# Patient Record
Sex: MALE | Race: WHITE | NOT HISPANIC OR LATINO | Employment: PART TIME | URBAN - METROPOLITAN AREA
[De-identification: names, ages, dates, MRNs, and addresses within clinical notes are randomized per-mention and may not be internally consistent; named-entity substitution may affect disease eponyms.]

---

## 2020-01-01 ENCOUNTER — HOME CARE/HOSPICE - HEALTHEAST (OUTPATIENT)
Dept: HOME HEALTH SERVICES | Facility: HOME HEALTH | Age: 45
End: 2020-01-01

## 2020-01-01 ENCOUNTER — COMMUNICATION - HEALTHEAST (OUTPATIENT)
Dept: HOME HEALTH SERVICES | Facility: HOME HEALTH | Age: 45
End: 2020-01-01

## 2020-01-01 ENCOUNTER — AMBULATORY - HEALTHEAST (OUTPATIENT)
Dept: LAB | Facility: CLINIC | Age: 45
End: 2020-01-01

## 2020-01-01 ENCOUNTER — OFFICE VISIT - HEALTHEAST (OUTPATIENT)
Dept: FAMILY MEDICINE | Facility: CLINIC | Age: 45
End: 2020-01-01

## 2020-01-01 ENCOUNTER — AMBULATORY - HEALTHEAST (OUTPATIENT)
Dept: FAMILY MEDICINE | Facility: CLINIC | Age: 45
End: 2020-01-01

## 2020-01-01 ENCOUNTER — COMMUNICATION - HEALTHEAST (OUTPATIENT)
Dept: FAMILY MEDICINE | Facility: CLINIC | Age: 45
End: 2020-01-01

## 2020-01-01 DIAGNOSIS — Z13.220 LIPID SCREENING: ICD-10-CM

## 2020-01-01 DIAGNOSIS — D72.829 LEUKOCYTOSIS, UNSPECIFIED TYPE: ICD-10-CM

## 2020-01-01 DIAGNOSIS — Z93.3 COLOSTOMY PRESENT (H): ICD-10-CM

## 2020-01-01 DIAGNOSIS — R73.9 ELEVATED BLOOD SUGAR: ICD-10-CM

## 2020-01-01 DIAGNOSIS — R00.0 TACHYCARDIA: ICD-10-CM

## 2020-01-01 DIAGNOSIS — T07.XXXA MULTIPLE TRAUMATIC INJURIES: ICD-10-CM

## 2020-01-01 DIAGNOSIS — R09.81 CONGESTION OF PARANASAL SINUS: ICD-10-CM

## 2020-01-01 DIAGNOSIS — B08.1 MOLLUSCUM CONTAGIOSUM: ICD-10-CM

## 2020-01-01 DIAGNOSIS — E66.01 MORBID OBESITY (H): ICD-10-CM

## 2020-01-01 DIAGNOSIS — E11.9 TYPE 2 DIABETES MELLITUS WITHOUT COMPLICATION, WITHOUT LONG-TERM CURRENT USE OF INSULIN (H): ICD-10-CM

## 2020-01-01 DIAGNOSIS — I10 BENIGN ESSENTIAL HYPERTENSION: ICD-10-CM

## 2020-01-01 LAB
ALBUMIN SERPL-MCNC: 3.9 G/DL (ref 3.5–5)
ALP SERPL-CCNC: 81 U/L (ref 45–120)
ALT SERPL W P-5'-P-CCNC: 74 U/L (ref 0–45)
ANION GAP SERPL CALCULATED.3IONS-SCNC: 9 MMOL/L (ref 5–18)
AST SERPL W P-5'-P-CCNC: 46 U/L (ref 0–40)
BASOPHILS # BLD AUTO: 0.1 THOU/UL (ref 0–0.2)
BASOPHILS NFR BLD AUTO: 1 % (ref 0–2)
BILIRUB SERPL-MCNC: 0.4 MG/DL (ref 0–1)
BUN SERPL-MCNC: 19 MG/DL (ref 8–22)
CALCIUM SERPL-MCNC: 9.6 MG/DL (ref 8.5–10.5)
CHLORIDE BLD-SCNC: 103 MMOL/L (ref 98–107)
CHOLEST SERPL-MCNC: 223 MG/DL
CO2 SERPL-SCNC: 25 MMOL/L (ref 22–31)
CREAT SERPL-MCNC: 0.79 MG/DL (ref 0.7–1.3)
EOSINOPHIL # BLD AUTO: 0.2 THOU/UL (ref 0–0.4)
EOSINOPHIL NFR BLD AUTO: 2 % (ref 0–6)
ERYTHROCYTE [DISTWIDTH] IN BLOOD BY AUTOMATED COUNT: 18.5 % (ref 11–14.5)
ERYTHROCYTE [DISTWIDTH] IN BLOOD BY AUTOMATED COUNT: 20.2 % (ref 11–14.5)
FASTING STATUS PATIENT QL REPORTED: YES
GFR SERPL CREATININE-BSD FRML MDRD: >60 ML/MIN/1.73M2
GLUCOSE BLD-MCNC: 182 MG/DL (ref 70–125)
HBA1C MFR BLD: 7 %
HCT VFR BLD AUTO: 46 % (ref 40–54)
HCT VFR BLD AUTO: 46.3 % (ref 40–54)
HDLC SERPL-MCNC: 74 MG/DL
HGB BLD-MCNC: 14.2 G/DL (ref 14–18)
HGB BLD-MCNC: 14.5 G/DL (ref 14–18)
IMM GRANULOCYTES # BLD: 0.5 THOU/UL
IMM GRANULOCYTES NFR BLD: 5 %
LDLC SERPL CALC-MCNC: 111 MG/DL
LYMPHOCYTES # BLD AUTO: 1.6 THOU/UL (ref 0.8–4.4)
LYMPHOCYTES NFR BLD AUTO: 15 % (ref 20–40)
MAGNESIUM SERPL-MCNC: 1.9 MG/DL (ref 1.8–2.6)
MCH RBC QN AUTO: 24.9 PG (ref 27–34)
MCH RBC QN AUTO: 25.2 PG (ref 27–34)
MCHC RBC AUTO-ENTMCNC: 30.7 G/DL (ref 32–36)
MCHC RBC AUTO-ENTMCNC: 31.5 G/DL (ref 32–36)
MCV RBC AUTO: 80 FL (ref 80–100)
MCV RBC AUTO: 81 FL (ref 80–100)
MONOCYTES # BLD AUTO: 0.9 THOU/UL (ref 0–0.9)
MONOCYTES NFR BLD AUTO: 8 % (ref 2–10)
NEUTROPHILS # BLD AUTO: 7.8 THOU/UL (ref 2–7.7)
NEUTROPHILS NFR BLD AUTO: 70 % (ref 50–70)
PLATELET # BLD AUTO: 279 THOU/UL (ref 140–440)
PLATELET # BLD AUTO: 296 THOU/UL (ref 140–440)
PMV BLD AUTO: 12.6 FL (ref 8.5–12.5)
PMV BLD AUTO: 8.3 FL (ref 7–10)
POTASSIUM BLD-SCNC: 4.9 MMOL/L (ref 3.5–5)
PROT SERPL-MCNC: 7.4 G/DL (ref 6–8)
RBC # BLD AUTO: 5.7 MILL/UL (ref 4.4–6.2)
RBC # BLD AUTO: 5.76 MILL/UL (ref 4.4–6.2)
SODIUM SERPL-SCNC: 137 MMOL/L (ref 136–145)
TRIGL SERPL-MCNC: 189 MG/DL
TSH SERPL DL<=0.005 MIU/L-ACNC: 1.2 UIU/ML (ref 0.3–5)
WBC: 11.1 THOU/UL (ref 4–11)
WBC: 18.4 THOU/UL (ref 4–11)

## 2020-08-14 ENCOUNTER — TRANSFERRED RECORDS (OUTPATIENT)
Dept: HEALTH INFORMATION MANAGEMENT | Facility: CLINIC | Age: 45
End: 2020-08-14

## 2020-08-17 LAB
ALT SERPL-CCNC: 86 IU/L (ref 12–68)
AST SERPL-CCNC: 47 IU/L (ref 12–37)

## 2020-09-04 ENCOUNTER — RECORDS - HEALTHEAST (OUTPATIENT)
Dept: ADMINISTRATIVE | Facility: OTHER | Age: 45
End: 2020-09-04

## 2020-09-07 LAB
CREAT SERPL-MCNC: 0.66 MG/DL (ref 0.7–1.3)
GFR SERPL CREATININE-BSD FRML MDRD: >60 ML/MIN
GLUCOSE SERPL-MCNC: 123 MG/DL (ref 74–106)

## 2020-09-08 LAB — POTASSIUM SERPL-SCNC: 3.9 MMOL/L (ref 3.5–5.1)

## 2020-09-12 ENCOUNTER — HOSPITAL ENCOUNTER (INPATIENT)
Facility: CLINIC | Age: 45
LOS: 10 days | Discharge: HOME HEALTH | DRG: 560 | End: 2020-09-22
Attending: PHYSICAL MEDICINE & REHABILITATION | Admitting: PHYSICAL MEDICINE & REHABILITATION
Payer: COMMERCIAL

## 2020-09-12 ENCOUNTER — APPOINTMENT (OUTPATIENT)
Dept: GENERAL RADIOLOGY | Facility: CLINIC | Age: 45
DRG: 560 | End: 2020-09-12
Attending: PHYSICAL MEDICINE & REHABILITATION
Payer: COMMERCIAL

## 2020-09-12 DIAGNOSIS — T07.XXXA MULTIPLE TRAUMATIC INJURIES: Primary | ICD-10-CM

## 2020-09-12 LAB
ANION GAP SERPL CALCULATED.3IONS-SCNC: 10 MMOL/L (ref 3–14)
BUN SERPL-MCNC: 7 MG/DL (ref 7–30)
CALCIUM SERPL-MCNC: 10 MG/DL (ref 8.5–10.1)
CHLORIDE SERPL-SCNC: 104 MMOL/L (ref 94–109)
CO2 SERPL-SCNC: 23 MMOL/L (ref 20–32)
CREAT SERPL-MCNC: 0.69 MG/DL (ref 0.66–1.25)
CRP SERPL-MCNC: 81.9 MG/L (ref 0–8)
ERYTHROCYTE [DISTWIDTH] IN BLOOD BY AUTOMATED COUNT: 21.2 % (ref 10–15)
ERYTHROCYTE [SEDIMENTATION RATE] IN BLOOD BY WESTERGREN METHOD: 70 MM/H (ref 0–15)
GFR SERPL CREATININE-BSD FRML MDRD: >90 ML/MIN/{1.73_M2}
GLUCOSE SERPL-MCNC: 104 MG/DL (ref 70–99)
HCT VFR BLD AUTO: 38.1 % (ref 40–53)
HGB BLD-MCNC: 11.1 G/DL (ref 13.3–17.7)
MCH RBC QN AUTO: 23 PG (ref 26.5–33)
MCHC RBC AUTO-ENTMCNC: 29.1 G/DL (ref 31.5–36.5)
MCV RBC AUTO: 79 FL (ref 78–100)
PLATELET # BLD AUTO: 535 10E9/L (ref 150–450)
POTASSIUM SERPL-SCNC: 3.4 MMOL/L (ref 3.4–5.3)
RBC # BLD AUTO: 4.82 10E12/L (ref 4.4–5.9)
SARS-COV-2 RNA SPEC QL NAA+PROBE: NORMAL
SODIUM SERPL-SCNC: 137 MMOL/L (ref 133–144)
SPECIMEN SOURCE: NORMAL
WBC # BLD AUTO: 8.6 10E9/L (ref 4–11)

## 2020-09-12 PROCEDURE — 80048 BASIC METABOLIC PNL TOTAL CA: CPT | Performed by: PHYSICAL MEDICINE & REHABILITATION

## 2020-09-12 PROCEDURE — 25000132 ZZH RX MED GY IP 250 OP 250 PS 637: Performed by: STUDENT IN AN ORGANIZED HEALTH CARE EDUCATION/TRAINING PROGRAM

## 2020-09-12 PROCEDURE — 12800006 ZZH R&B REHAB

## 2020-09-12 PROCEDURE — U0003 INFECTIOUS AGENT DETECTION BY NUCLEIC ACID (DNA OR RNA); SEVERE ACUTE RESPIRATORY SYNDROME CORONAVIRUS 2 (SARS-COV-2) (CORONAVIRUS DISEASE [COVID-19]), AMPLIFIED PROBE TECHNIQUE, MAKING USE OF HIGH THROUGHPUT TECHNOLOGIES AS DESCRIBED BY CMS-2020-01-R: HCPCS | Performed by: PHYSICAL MEDICINE & REHABILITATION

## 2020-09-12 PROCEDURE — 86140 C-REACTIVE PROTEIN: CPT | Performed by: PHYSICAL MEDICINE & REHABILITATION

## 2020-09-12 PROCEDURE — 36415 COLL VENOUS BLD VENIPUNCTURE: CPT | Performed by: PHYSICAL MEDICINE & REHABILITATION

## 2020-09-12 PROCEDURE — 71046 X-RAY EXAM CHEST 2 VIEWS: CPT

## 2020-09-12 PROCEDURE — 85027 COMPLETE CBC AUTOMATED: CPT | Performed by: PHYSICAL MEDICINE & REHABILITATION

## 2020-09-12 PROCEDURE — 85652 RBC SED RATE AUTOMATED: CPT | Performed by: PHYSICAL MEDICINE & REHABILITATION

## 2020-09-12 RX ORDER — POLYETHYLENE GLYCOL 3350 17 G/17G
17 POWDER, FOR SOLUTION ORAL DAILY PRN
Status: DISCONTINUED | OUTPATIENT
Start: 2020-09-12 | End: 2020-09-22 | Stop reason: HOSPADM

## 2020-09-12 RX ORDER — OXYCODONE HYDROCHLORIDE 5 MG/1
5 TABLET ORAL EVERY 4 HOURS PRN
Status: DISCONTINUED | OUTPATIENT
Start: 2020-09-12 | End: 2020-09-22 | Stop reason: HOSPADM

## 2020-09-12 RX ORDER — ACETAMINOPHEN 325 MG/1
975 TABLET ORAL 3 TIMES DAILY
Status: DISCONTINUED | OUTPATIENT
Start: 2020-09-12 | End: 2020-09-22 | Stop reason: HOSPADM

## 2020-09-12 RX ORDER — MINERAL OIL/HYDROPHIL PETROLAT
OINTMENT (GRAM) TOPICAL
Status: DISCONTINUED | OUTPATIENT
Start: 2020-09-12 | End: 2020-09-22 | Stop reason: HOSPADM

## 2020-09-12 RX ORDER — HYDROXYZINE HYDROCHLORIDE 25 MG/1
25 TABLET, FILM COATED ORAL EVERY 6 HOURS PRN
Status: DISCONTINUED | OUTPATIENT
Start: 2020-09-12 | End: 2020-09-22 | Stop reason: HOSPADM

## 2020-09-12 RX ORDER — LANOLIN ALCOHOL/MO/W.PET/CERES
9 CREAM (GRAM) TOPICAL EVERY 24 HOURS
Status: DISCONTINUED | OUTPATIENT
Start: 2020-09-12 | End: 2020-09-22 | Stop reason: HOSPADM

## 2020-09-12 RX ORDER — DOCUSATE SODIUM 100 MG/1
100 CAPSULE, LIQUID FILLED ORAL 2 TIMES DAILY
Status: DISCONTINUED | OUTPATIENT
Start: 2020-09-12 | End: 2020-09-22 | Stop reason: HOSPADM

## 2020-09-12 RX ORDER — LORATADINE 10 MG/1
10 TABLET ORAL DAILY
Status: DISCONTINUED | OUTPATIENT
Start: 2020-09-12 | End: 2020-09-22 | Stop reason: HOSPADM

## 2020-09-12 RX ORDER — NALOXONE HYDROCHLORIDE 0.4 MG/ML
.1-.4 INJECTION, SOLUTION INTRAMUSCULAR; INTRAVENOUS; SUBCUTANEOUS
Status: DISCONTINUED | OUTPATIENT
Start: 2020-09-12 | End: 2020-09-22 | Stop reason: HOSPADM

## 2020-09-12 RX ORDER — MULTIPLE VITAMINS W/ MINERALS TAB 9MG-400MCG
1 TAB ORAL DAILY
Status: DISCONTINUED | OUTPATIENT
Start: 2020-09-12 | End: 2020-09-22 | Stop reason: HOSPADM

## 2020-09-12 RX ORDER — MIRTAZAPINE 15 MG/1
15 TABLET, FILM COATED ORAL AT BEDTIME
Status: DISCONTINUED | OUTPATIENT
Start: 2020-09-12 | End: 2020-09-22 | Stop reason: HOSPADM

## 2020-09-12 RX ORDER — FERROUS SULFATE 325(65) MG
325 TABLET ORAL 2 TIMES DAILY WITH MEALS
Status: DISCONTINUED | OUTPATIENT
Start: 2020-09-12 | End: 2020-09-22 | Stop reason: HOSPADM

## 2020-09-12 RX ORDER — HYDROXYZINE HYDROCHLORIDE 25 MG/1
50 TABLET, FILM COATED ORAL EVERY 6 HOURS PRN
Status: DISCONTINUED | OUTPATIENT
Start: 2020-09-12 | End: 2020-09-22 | Stop reason: HOSPADM

## 2020-09-12 RX ORDER — BENZOCAINE/MENTHOL 6 MG-10 MG
LOZENGE MUCOUS MEMBRANE 3 TIMES DAILY PRN
Status: DISCONTINUED | OUTPATIENT
Start: 2020-09-12 | End: 2020-09-22 | Stop reason: HOSPADM

## 2020-09-12 RX ADMIN — Medication 1 G: at 20:55

## 2020-09-12 RX ADMIN — DOCUSATE SODIUM 100 MG: 100 CAPSULE, LIQUID FILLED ORAL at 20:53

## 2020-09-12 RX ADMIN — MIRTAZAPINE 15 MG: 15 TABLET, FILM COATED ORAL at 21:01

## 2020-09-12 RX ADMIN — FERROUS SULFATE TAB 325 MG (65 MG ELEMENTAL FE) 325 MG: 325 (65 FE) TAB at 18:08

## 2020-09-12 RX ADMIN — MELATONIN TAB 3 MG 9 MG: 3 TAB at 20:53

## 2020-09-12 RX ADMIN — ACETAMINOPHEN 975 MG: 325 TABLET, FILM COATED ORAL at 20:53

## 2020-09-12 RX ADMIN — Medication 1 G: at 18:09

## 2020-09-12 RX ADMIN — APIXABAN 5 MG: 2.5 TABLET, FILM COATED ORAL at 20:53
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
## 2020-09-12 NOTE — PROGRESS NOTES
Patient is alert/oriented x4, arrived around 12:30pm.  Patient was able to stand on standing scale and pivot into bed with Ao1, fatigues quickly.  Patient was tachy at start of shift, per report has been going up into 130's with activity, also had low grade temp 99.2.  Patient denies pain at this time, has had mild cough that is productive, some crackles in bases, given incentive spirometer. MD aware of this.  Patient still had low grade temp at 1440, and HR was still tachy 130 at rest, MD updated on this- held scheduled Tylenol to continue to monitor.  Patient ordered late lunch and denies difficulty with swallowing. Next shift aware to f/u on concerns.  Special ISO started for now, patient aware.

## 2020-09-13 LAB
ANION GAP SERPL CALCULATED.3IONS-SCNC: 10 MMOL/L (ref 3–14)
BASOPHILS # BLD AUTO: 0.1 10E9/L (ref 0–0.2)
BASOPHILS NFR BLD AUTO: 0.8 %
BUN SERPL-MCNC: 6 MG/DL (ref 7–30)
CALCIUM SERPL-MCNC: 9.9 MG/DL (ref 8.5–10.1)
CHLORIDE SERPL-SCNC: 105 MMOL/L (ref 94–109)
CO2 SERPL-SCNC: 25 MMOL/L (ref 20–32)
CREAT SERPL-MCNC: 0.68 MG/DL (ref 0.66–1.25)
DIFFERENTIAL METHOD BLD: ABNORMAL
EOSINOPHIL # BLD AUTO: 0.4 10E9/L (ref 0–0.7)
EOSINOPHIL NFR BLD AUTO: 4.9 %
ERYTHROCYTE [DISTWIDTH] IN BLOOD BY AUTOMATED COUNT: 21 % (ref 10–15)
GFR SERPL CREATININE-BSD FRML MDRD: >90 ML/MIN/{1.73_M2}
GLUCOSE SERPL-MCNC: 96 MG/DL (ref 70–99)
HCT VFR BLD AUTO: 36.8 % (ref 40–53)
HGB BLD-MCNC: 11.1 G/DL (ref 13.3–17.7)
IMM GRANULOCYTES # BLD: 0.1 10E9/L (ref 0–0.4)
IMM GRANULOCYTES NFR BLD: 0.8 %
LABORATORY COMMENT REPORT: NORMAL
LYMPHOCYTES # BLD AUTO: 1.3 10E9/L (ref 0.8–5.3)
LYMPHOCYTES NFR BLD AUTO: 16.7 %
MCH RBC QN AUTO: 23.4 PG (ref 26.5–33)
MCHC RBC AUTO-ENTMCNC: 30.2 G/DL (ref 31.5–36.5)
MCV RBC AUTO: 78 FL (ref 78–100)
MONOCYTES # BLD AUTO: 0.8 10E9/L (ref 0–1.3)
MONOCYTES NFR BLD AUTO: 10 %
NEUTROPHILS # BLD AUTO: 5.2 10E9/L (ref 1.6–8.3)
NEUTROPHILS NFR BLD AUTO: 66.8 %
NRBC # BLD AUTO: 0 10*3/UL
NRBC BLD AUTO-RTO: 0 /100
PLATELET # BLD AUTO: 575 10E9/L (ref 150–450)
POTASSIUM SERPL-SCNC: 3.6 MMOL/L (ref 3.4–5.3)
RBC # BLD AUTO: 4.74 10E12/L (ref 4.4–5.9)
SARS-COV-2 RNA SPEC QL NAA+PROBE: NEGATIVE
SODIUM SERPL-SCNC: 140 MMOL/L (ref 133–144)
SPECIMEN SOURCE: NORMAL
WBC # BLD AUTO: 7.8 10E9/L (ref 4–11)

## 2020-09-13 PROCEDURE — 25000132 ZZH RX MED GY IP 250 OP 250 PS 637: Performed by: STUDENT IN AN ORGANIZED HEALTH CARE EDUCATION/TRAINING PROGRAM

## 2020-09-13 PROCEDURE — 12800006 ZZH R&B REHAB

## 2020-09-13 PROCEDURE — 97165 OT EVAL LOW COMPLEX 30 MIN: CPT | Mod: GO | Performed by: OCCUPATIONAL THERAPIST

## 2020-09-13 PROCEDURE — 36415 COLL VENOUS BLD VENIPUNCTURE: CPT | Performed by: PHYSICAL MEDICINE & REHABILITATION

## 2020-09-13 PROCEDURE — 97530 THERAPEUTIC ACTIVITIES: CPT | Mod: GP | Performed by: STUDENT IN AN ORGANIZED HEALTH CARE EDUCATION/TRAINING PROGRAM

## 2020-09-13 PROCEDURE — 97535 SELF CARE MNGMENT TRAINING: CPT | Mod: GO | Performed by: OCCUPATIONAL THERAPIST

## 2020-09-13 PROCEDURE — 97116 GAIT TRAINING THERAPY: CPT | Mod: GP | Performed by: STUDENT IN AN ORGANIZED HEALTH CARE EDUCATION/TRAINING PROGRAM

## 2020-09-13 PROCEDURE — 85025 COMPLETE CBC W/AUTO DIFF WBC: CPT | Performed by: PHYSICAL MEDICINE & REHABILITATION

## 2020-09-13 PROCEDURE — 97110 THERAPEUTIC EXERCISES: CPT | Mod: GP | Performed by: STUDENT IN AN ORGANIZED HEALTH CARE EDUCATION/TRAINING PROGRAM

## 2020-09-13 PROCEDURE — 80048 BASIC METABOLIC PNL TOTAL CA: CPT | Performed by: PHYSICAL MEDICINE & REHABILITATION

## 2020-09-13 PROCEDURE — 97163 PT EVAL HIGH COMPLEX 45 MIN: CPT | Mod: GP | Performed by: STUDENT IN AN ORGANIZED HEALTH CARE EDUCATION/TRAINING PROGRAM

## 2020-09-13 RX ORDER — ACETAMINOPHEN 325 MG/1
975 TABLET ORAL 3 TIMES DAILY
Status: ON HOLD | COMMUNITY
End: 2020-09-21

## 2020-09-13 RX ORDER — ASPIRIN 81 MG
100 TABLET, DELAYED RELEASE (ENTERIC COATED) ORAL 2 TIMES DAILY
Status: ON HOLD | COMMUNITY
End: 2020-09-21

## 2020-09-13 RX ORDER — FERROUS SULFATE 325(65) MG
325 TABLET ORAL 2 TIMES DAILY
Status: ON HOLD | COMMUNITY
End: 2020-09-21

## 2020-09-13 RX ORDER — MIRTAZAPINE 15 MG/1
15 TABLET, FILM COATED ORAL AT BEDTIME
Status: ON HOLD | COMMUNITY
End: 2020-09-21

## 2020-09-13 RX ORDER — OXYCODONE HYDROCHLORIDE 5 MG/1
5 TABLET ORAL EVERY 4 HOURS PRN
Status: ON HOLD | COMMUNITY
End: 2020-09-21

## 2020-09-13 RX ORDER — LORATADINE 10 MG/1
10 TABLET ORAL DAILY
COMMUNITY

## 2020-09-13 RX ORDER — MULTIPLE VITAMINS W/ MINERALS TAB 9MG-400MCG
1 TAB ORAL DAILY
Status: ON HOLD | COMMUNITY
End: 2020-09-21

## 2020-09-13 RX ADMIN — MULTIPLE VITAMINS W/ MINERALS TAB 1 TABLET: TAB at 08:37

## 2020-09-13 RX ADMIN — FERROUS SULFATE TAB 325 MG (65 MG ELEMENTAL FE) 325 MG: 325 (65 FE) TAB at 18:39

## 2020-09-13 RX ADMIN — DOCUSATE SODIUM 100 MG: 100 CAPSULE, LIQUID FILLED ORAL at 08:37

## 2020-09-13 RX ADMIN — APIXABAN 5 MG: 2.5 TABLET, FILM COATED ORAL at 20:47

## 2020-09-13 RX ADMIN — Medication 1 G: at 14:45

## 2020-09-13 RX ADMIN — FERROUS SULFATE TAB 325 MG (65 MG ELEMENTAL FE) 325 MG: 325 (65 FE) TAB at 08:37

## 2020-09-13 RX ADMIN — MIRTAZAPINE 15 MG: 15 TABLET, FILM COATED ORAL at 21:21

## 2020-09-13 RX ADMIN — OXYCODONE HYDROCHLORIDE 5 MG: 5 TABLET ORAL at 09:36

## 2020-09-13 RX ADMIN — DOCUSATE SODIUM 100 MG: 100 CAPSULE, LIQUID FILLED ORAL at 20:47

## 2020-09-13 RX ADMIN — ACETAMINOPHEN 975 MG: 325 TABLET, FILM COATED ORAL at 20:47

## 2020-09-13 RX ADMIN — MELATONIN TAB 3 MG 9 MG: 3 TAB at 21:20

## 2020-09-13 RX ADMIN — ACETAMINOPHEN 975 MG: 325 TABLET, FILM COATED ORAL at 08:37

## 2020-09-13 RX ADMIN — LORATADINE 10 MG: 10 TABLET ORAL at 08:37

## 2020-09-13 RX ADMIN — APIXABAN 5 MG: 2.5 TABLET, FILM COATED ORAL at 08:37

## 2020-09-13 RX ADMIN — OXYCODONE HYDROCHLORIDE 5 MG: 5 TABLET ORAL at 18:39

## 2020-09-13 RX ADMIN — OXYCODONE HYDROCHLORIDE 5 MG: 5 TABLET ORAL at 14:44

## 2020-09-13 RX ADMIN — ACETAMINOPHEN 975 MG: 325 TABLET, FILM COATED ORAL at 14:44

## 2020-09-13 ASSESSMENT — ACTIVITIES OF DAILY LIVING (ADL): PREVIOUS_RESPONSIBILITIES: MEAL PREP;HOUSEKEEPING;LAUNDRY;SHOPPING;YARDWORK;MEDICATION MANAGEMENT;FINANCES;DRIVING;WORK

## 2020-09-13 NOTE — PHARMACY-MEDICATION REGIMEN REVIEW
Pharmacy Medication Regimen Review  Dejan Massey is a 45 year old male who is currently in the Acute Rehab Unit.    Assessment: All medications have an appropriate indications, durations and no unnecessary use was found    Plan:   Continue current medications regimen  Attending provider will be sent this note for review.  If there are any emergent issues noted above, pharmacist will contact provider directly by phone.      Pharmacy will periodically review the resident's medication regimen for any PRN medications not administered in > 72 hours and discontinue them. The pharmacist will discuss gradual dose reductions of psychopharmacologic medications with interdisciplinary team on a regular basis.    Please contact pharmacy if the above does not answer specific medication questions/concerns.    Background:  A pharmacist has reviewed all medications and pertinent medical history today.  Medications were reviewed for appropriate use and any irregularities found are listed with recommendations.      Current Facility-Administered Medications:      acetaminophen (TYLENOL) tablet 975 mg, 975 mg, Oral, TID, Mike Coe, DO, 975 mg at 09/13/20 0837     apixaban ANTICOAGULANT (ELIQUIS) tablet 5 mg, 5 mg, Oral, BID, Carolin, Mike, DO, 5 mg at 09/13/20 0837     dimethicone 1.3 % lotion LOTN 1 g, 1 applicator, Apply externally, TID, Carolin, Mike, DO, 1 g at 09/12/20 2055     docusate sodium (COLACE) capsule 100 mg, 100 mg, Oral, BID, Carolin, Mike, DO, 100 mg at 09/13/20 0837     ferrous sulfate (FEROSUL) tablet 325 mg, 325 mg, Oral, BID w/meals, Carolin, Mike, DO, 325 mg at 09/13/20 0837     guaiFENesin (ROBITUSSIN) 20 mg/mL solution 10 mL, 10 mL, Oral, Q4H PRN, Mike Coe,      hydrocortisone (CORTAID) 1 % cream, , Topical, TID PRN, Mike Coe, DO     hydrOXYzine (ATARAX) tablet 25 mg, 25 mg, Oral, Q6H PRN **OR** hydrOXYzine (ATARAX) tablet 50 mg, 50 mg, Oral, Q6H PRN, Mike Coe,      loratadine  (CLARITIN) tablet 10 mg, 10 mg, Oral, Daily, Mike Coe DO, 10 mg at 09/13/20 0837     melatonin tablet 9 mg, 9 mg, Oral, Q24H, Mike Coe, , 9 mg at 09/12/20 2053     mineral oil-hydrophilic petrolatum (AQUAPHOR), , Topical, Q1H PRN, Mike Coe DO     mirtazapine (REMERON) tablet 15 mg, 15 mg, Oral, At Bedtime, Mike Coe DO, 15 mg at 09/12/20 2101     multivitamin w/minerals (THERA-VIT-M) tablet 1 tablet, 1 tablet, Oral, Daily, Mike Coe DO, 1 tablet at 09/13/20 0837     naloxone (NARCAN) injection 0.1-0.4 mg, 0.1-0.4 mg, Intravenous, Q2 Min PRN, Kaity Varela MD     oxyCODONE (ROXICODONE) tablet 5 mg, 5 mg, Oral, Q4H PRN, Mike Coe DO, 5 mg at 09/13/20 0936     Patient is already receiving anticoagulation with heparin, enoxaparin (LOVENOX), warfarin (COUMADIN)  or other anticoagulant medication, , Does not apply, Continuous PRN, Mike Coe DO     polyethylene glycol (MIRALAX) Packet 17 g, 17 g, Oral, Daily PRN, Mike Coe DO  No current outpatient prescriptions on file.   PMH:   Ulcerative colitis     Araceli Graf, PharmD, BCPS

## 2020-09-13 NOTE — PLAN OF CARE
FOCUS/GOAL  Wound care management, Medical management, Skin integrity, and Safety management    ASSESSMENT, INTERVENTIONS AND CONTINUING PLAN FOR GOAL:    VS: /63 (BP Location: Left arm)   Pulse 119   Temp 99.9  F (37.7  C) (Oral)   Resp 20   SpO2 92%    O2: Stable on room air, denies SOB.   Bladder Continent, using urinal at bedside   Last BM: Pt has a colostomy bag   Activity: Assist of 1 with a walker, ambulated from bed to wheel chair.   Skin: Multiple wounds and incisions. See flow sheet   Pain: Denies pain.   CMS: Intact, denies numbness/ tingling.   Dressing: Dressing on wounds changed per plan of care.   Diet: Regular/thin, swallows pills whole without difficulty.   Equipment: Walker and wheel chair.   Lines/tubes No lines   Additional Info: Pt is tachycardic, has a low grade temp and has a productive cough. Dr. Melissa GREEN notified. Chest x-ray done, see results and Covid swab completed, results pending. Pt is currently on special precautions pending results.  Pt is alert and oriented x4. Able to make needs known and using call light appropriately to seek assistance. Alarms on for safety.

## 2020-09-13 NOTE — PLAN OF CARE
FOCUS/GOAL  Medication management and Medical management    ASSESSMENT, INTERVENTIONS AND CONTINUING PLAN FOR GOAL:  A&O, A1 w/ walker, Makes needs known, alarms on.  Pt denied pain during night.  Continent of bladder. Colostomy bag patent.  Continue with POC.     Anesthesia Type: 1% lidocaine with epinephrine and a 1:10 solution of 8.4% sodium bicarbonate

## 2020-09-13 NOTE — PROGRESS NOTES
"Patient is alert/oriented x4, was sleepy this am when brought meds in but was afebrile and is less tachycardic than yesterday, T:98.7,  at rest.  Patient was on special isolation this am until Covid rule out- result was negative at 1020am.  Somewhat low BP this am 108/72, encouraged to push fluids. Patient was able to order breakfast after getting AM meds.  Brought in Oxy with breakfast so that patient requested for pain control with therapies, pt reports \"pain starts when I try to move,\" c/o pain in shoulders and back.  When bringing patient breakfast at 0930 he reported \"stomach ache\" asked about the colostomy and he said, \"oh yeah maybe that needs to be checked.\"  Colostomy had some soft stool and was full of gas so was emptied at that time and patient reported relief.  No new care concerns, continue with POC.    "
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
## 2020-09-13 NOTE — PLAN OF CARE
Discharge Planner OT   Patient plan for discharge: home with family assist  Current status: Pt presenting with decreased ADL/IADL and trf skill. Limited by pain, decreased flexibility and WBAT. HR throughout sessions 118-144, able to decreased with sitting/laying rest. Pt did not note symptoms. Bed mobility-min A/CGA, dressing min A-SBA, trf min A/CGA- variable performance d/t pain levels.   Barriers to return to prior living situation: pain, general weakness, level of current functional ability  Recommendations for discharge: shower chair  Rationale for recommendations: maximize IND for safe discharge home.        Entered by: Tino Lan 09/13/2020 5:22 PM
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
## 2020-09-14 ENCOUNTER — APPOINTMENT (OUTPATIENT)
Dept: PHYSICAL THERAPY | Facility: CLINIC | Age: 45
End: 2020-09-14
Payer: COMMERCIAL

## 2020-09-14 ENCOUNTER — APPOINTMENT (OUTPATIENT)
Dept: OCCUPATIONAL THERAPY | Facility: CLINIC | Age: 45
End: 2020-09-14
Payer: COMMERCIAL

## 2020-09-14 PROCEDURE — G0463 HOSPITAL OUTPT CLINIC VISIT: HCPCS

## 2020-09-14 PROCEDURE — 97530 THERAPEUTIC ACTIVITIES: CPT | Mod: GO | Performed by: STUDENT IN AN ORGANIZED HEALTH CARE EDUCATION/TRAINING PROGRAM

## 2020-09-14 PROCEDURE — 25000132 ZZH RX MED GY IP 250 OP 250 PS 637: Performed by: STUDENT IN AN ORGANIZED HEALTH CARE EDUCATION/TRAINING PROGRAM

## 2020-09-14 PROCEDURE — 97535 SELF CARE MNGMENT TRAINING: CPT | Mod: GO | Performed by: STUDENT IN AN ORGANIZED HEALTH CARE EDUCATION/TRAINING PROGRAM

## 2020-09-14 PROCEDURE — 97116 GAIT TRAINING THERAPY: CPT | Mod: GP | Performed by: STUDENT IN AN ORGANIZED HEALTH CARE EDUCATION/TRAINING PROGRAM

## 2020-09-14 PROCEDURE — 97530 THERAPEUTIC ACTIVITIES: CPT | Mod: GP | Performed by: STUDENT IN AN ORGANIZED HEALTH CARE EDUCATION/TRAINING PROGRAM

## 2020-09-14 PROCEDURE — 97110 THERAPEUTIC EXERCISES: CPT | Mod: GO | Performed by: STUDENT IN AN ORGANIZED HEALTH CARE EDUCATION/TRAINING PROGRAM

## 2020-09-14 PROCEDURE — 12800006 ZZH R&B REHAB

## 2020-09-14 RX ADMIN — LORATADINE 10 MG: 10 TABLET ORAL at 08:45

## 2020-09-14 RX ADMIN — FERROUS SULFATE TAB 325 MG (65 MG ELEMENTAL FE) 325 MG: 325 (65 FE) TAB at 08:45

## 2020-09-14 RX ADMIN — MULTIPLE VITAMINS W/ MINERALS TAB 1 TABLET: TAB at 08:45

## 2020-09-14 RX ADMIN — ACETAMINOPHEN 975 MG: 325 TABLET, FILM COATED ORAL at 08:45

## 2020-09-14 RX ADMIN — OXYCODONE HYDROCHLORIDE 5 MG: 5 TABLET ORAL at 21:07

## 2020-09-14 RX ADMIN — APIXABAN 5 MG: 2.5 TABLET, FILM COATED ORAL at 08:45

## 2020-09-14 RX ADMIN — ACETAMINOPHEN 975 MG: 325 TABLET, FILM COATED ORAL at 21:07

## 2020-09-14 RX ADMIN — MIRTAZAPINE 15 MG: 15 TABLET, FILM COATED ORAL at 21:12

## 2020-09-14 RX ADMIN — MELATONIN TAB 3 MG 9 MG: 3 TAB at 21:10

## 2020-09-14 RX ADMIN — DOCUSATE SODIUM 100 MG: 100 CAPSULE, LIQUID FILLED ORAL at 08:45

## 2020-09-14 RX ADMIN — ACETAMINOPHEN 975 MG: 325 TABLET, FILM COATED ORAL at 14:09

## 2020-09-14 RX ADMIN — FERROUS SULFATE TAB 325 MG (65 MG ELEMENTAL FE) 325 MG: 325 (65 FE) TAB at 18:09

## 2020-09-14 RX ADMIN — OXYCODONE HYDROCHLORIDE 5 MG: 5 TABLET ORAL at 14:09

## 2020-09-14 RX ADMIN — APIXABAN 5 MG: 2.5 TABLET, FILM COATED ORAL at 21:08

## 2020-09-14 NOTE — PLAN OF CARE
OT: ADL completed except for shower. Pt would like to shave this PM and shower afterwards. Pt's shower day with nursing is today. Recommend WC transport and use of shower chair to sit on.     Pt walked to bathroom with CGA using walker and stood for oral hyg. Set up for dressing. Pt is limited by fatigue and weakness.

## 2020-09-14 NOTE — PLAN OF CARE
Patient is Alert and Oriented x4, able to make needs known. Using tylenol to manage pain in back. Voiding without diffuculty. Ostomy bag, and wound dressing changed with WOC. PRN oxycodone given x1 with scheduled tylenol to manage aching pain in back. Declined shower with OT this AM, would like to shower tonight with nursing. Continue with POC.

## 2020-09-14 NOTE — PROGRESS NOTES
Attempted to see pt for scheduled pm PT appt, however pt stating he felt exhausted, 8-9/10 OMNI, regretfully unable to participate with therapy. Therapist assisted pt into bed, positioned with ice pack to lumbar area, lower LLE. RN aware. Missed 60 minutes

## 2020-09-14 NOTE — PLAN OF CARE
Pt continues to have low-grade temp and tachycadia sinc yesterday, MD aware of symptoms. A/Ox's 4. Pain rated as tolerable. Oxycodone given for pain control. Tolerated regular diet. Denied any nausea, CP, SOB, lightheadedness or dizziness. Voiding without pain or difficulty in urinal. Passing flatus. Dressings changed and new ostomy applied.  Resting in bed at this time with call light in reach and able to make needs known.
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
## 2020-09-15 ENCOUNTER — APPOINTMENT (OUTPATIENT)
Dept: PHYSICAL THERAPY | Facility: CLINIC | Age: 45
End: 2020-09-15
Payer: COMMERCIAL

## 2020-09-15 ENCOUNTER — APPOINTMENT (OUTPATIENT)
Dept: OCCUPATIONAL THERAPY | Facility: CLINIC | Age: 45
End: 2020-09-15
Payer: COMMERCIAL

## 2020-09-15 LAB — LACTATE BLD-SCNC: 1 MMOL/L (ref 0.7–2)

## 2020-09-15 PROCEDURE — 83605 ASSAY OF LACTIC ACID: CPT | Performed by: PHYSICAL MEDICINE & REHABILITATION

## 2020-09-15 PROCEDURE — 97116 GAIT TRAINING THERAPY: CPT | Mod: GP

## 2020-09-15 PROCEDURE — 97110 THERAPEUTIC EXERCISES: CPT | Mod: GO | Performed by: STUDENT IN AN ORGANIZED HEALTH CARE EDUCATION/TRAINING PROGRAM

## 2020-09-15 PROCEDURE — 36415 COLL VENOUS BLD VENIPUNCTURE: CPT | Performed by: PHYSICAL MEDICINE & REHABILITATION

## 2020-09-15 PROCEDURE — 25000132 ZZH RX MED GY IP 250 OP 250 PS 637: Performed by: STUDENT IN AN ORGANIZED HEALTH CARE EDUCATION/TRAINING PROGRAM

## 2020-09-15 PROCEDURE — 12800006 ZZH R&B REHAB

## 2020-09-15 PROCEDURE — 97530 THERAPEUTIC ACTIVITIES: CPT | Mod: GP

## 2020-09-15 PROCEDURE — 97535 SELF CARE MNGMENT TRAINING: CPT | Mod: GO | Performed by: STUDENT IN AN ORGANIZED HEALTH CARE EDUCATION/TRAINING PROGRAM

## 2020-09-15 PROCEDURE — 97110 THERAPEUTIC EXERCISES: CPT | Mod: GP

## 2020-09-15 RX ADMIN — DOCUSATE SODIUM 100 MG: 100 CAPSULE, LIQUID FILLED ORAL at 07:53

## 2020-09-15 RX ADMIN — MIRTAZAPINE 15 MG: 15 TABLET, FILM COATED ORAL at 21:04

## 2020-09-15 RX ADMIN — LORATADINE 10 MG: 10 TABLET ORAL at 07:53

## 2020-09-15 RX ADMIN — ACETAMINOPHEN 975 MG: 325 TABLET, FILM COATED ORAL at 21:04

## 2020-09-15 RX ADMIN — FERROUS SULFATE TAB 325 MG (65 MG ELEMENTAL FE) 325 MG: 325 (65 FE) TAB at 07:53

## 2020-09-15 RX ADMIN — ACETAMINOPHEN 975 MG: 325 TABLET, FILM COATED ORAL at 07:53

## 2020-09-15 RX ADMIN — MULTIPLE VITAMINS W/ MINERALS TAB 1 TABLET: TAB at 07:53

## 2020-09-15 RX ADMIN — APIXABAN 5 MG: 2.5 TABLET, FILM COATED ORAL at 21:04

## 2020-09-15 RX ADMIN — MELATONIN TAB 3 MG 9 MG: 3 TAB at 21:04

## 2020-09-15 RX ADMIN — ACETAMINOPHEN 975 MG: 325 TABLET, FILM COATED ORAL at 13:30

## 2020-09-15 RX ADMIN — FERROUS SULFATE TAB 325 MG (65 MG ELEMENTAL FE) 325 MG: 325 (65 FE) TAB at 17:31

## 2020-09-15 RX ADMIN — Medication 1 G: at 21:04

## 2020-09-15 RX ADMIN — DOCUSATE SODIUM 100 MG: 100 CAPSULE, LIQUID FILLED ORAL at 21:04

## 2020-09-15 RX ADMIN — APIXABAN 5 MG: 2.5 TABLET, FILM COATED ORAL at 07:53

## 2020-09-15 NOTE — PROGRESS NOTES
Pt alert/oriented. Continent of bladder, has a colostomy bag with loose stool. Told night shift nurse to pass on he should be drinking more fluids. Low temp 99 again. VSS except HR is in 115-120's, I understand the doctors are aware and still trying to figure out the root cause. I changed the mepliex on the right thigh due to excess drainage, but WOC did wound care earlier today. Pt had moderate pain 5/10 in his right leg, and I gave him oxycodone 5mg at 2100. Pt mobilizes with assist of one and walker. I put in a work order for his call light issues since a noise was bothering him.

## 2020-09-15 NOTE — PLAN OF CARE
Discharge Planner OT   Patient plan for discharge: Home with assist  Current status: SBA for dressing, ADL at sink, assist for toileting due to colostomy. CGA for short distance ambulation with walker. Pt requires a lot of rest break, 's with activity  Barriers to return to prior living situation: needs to be BARNEY, stairs to basement, need to determine bathroom DME needs  Recommendations for discharge: Recommend assist for IADL and HH OT  Rationale for recommendations: pt presentation       Entered by: Yovana Anthony 09/15/2020 11:24 AM

## 2020-09-15 NOTE — PLAN OF CARE
Alert and oriented. Able to make needs known. Call light in reach. Appears to be sleeping during rounds. No requests for pain meds so far this shift. Using the urinal at bedside. Colostomy patent, intact with loose stool.  Continue with plan of care.

## 2020-09-15 NOTE — PLAN OF CARE
"PT: Pt ambulated ~30' x 3 with FWW, stair climbing up/down 4 -6\" steps x 2 sets with seated rest in between sets,  with activity, CGA during mobility. Noted swelling on dorsum of R foot at end of session, elevated LE's. -10 due to schedule conflict  "

## 2020-09-15 NOTE — PROGRESS NOTES
When meeting with pt yesterday for assessment, SW discussed anticipation for [facility]. Offered choice and pt denied preference. Pt was agreeable to a [facility] referral. Per gonzalo, plan for discharge home on Monday 09/21 with [facility] RN, PT and OT. SW sent referral to [facility] this morning, HE will service due to location where pt lives.     [facility] Liaison called PCP clinic and they do not have pt listed with a PCP at that clinic, last time he was seen was 2017. [facility] notified that pt will need to establish care with a new PCP. Will ask attending MD to sign [facility] orders until PCP care is established.     Will touch base with pt closer to discharge to confirm discharge plans and answer any questions/concerns.     [facility]   Phone: [phone]  Fax :[phone]  RN, PT, OT     Mai Carter, EDDIE, [facility] [place]  [facility]   Phone: [phone]  I   Pager: [phone]

## 2020-09-15 NOTE — PROGRESS NOTES
Patient is A&OX4, able to make needs known. Generalized aching pain which patient is using tylenol to manage pain. Voiding in urinal without difficulties. Colostomy having average output loose stool. Dressing to R. Anterior thigh changed d/t drainage. Ankle, and abdomen dressing intact. Continue with POC.
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
## 2020-09-16 ENCOUNTER — APPOINTMENT (OUTPATIENT)
Dept: PHYSICAL THERAPY | Facility: CLINIC | Age: 45
End: 2020-09-16
Payer: COMMERCIAL

## 2020-09-16 ENCOUNTER — APPOINTMENT (OUTPATIENT)
Dept: OCCUPATIONAL THERAPY | Facility: CLINIC | Age: 45
End: 2020-09-16
Payer: COMMERCIAL

## 2020-09-16 ENCOUNTER — HOME CARE/HOSPICE - HEALTHEAST (OUTPATIENT)
Dept: HOME HEALTH SERVICES | Facility: HOME HEALTH | Age: 45
End: 2020-09-16

## 2020-09-16 LAB — LACTATE BLD-SCNC: 0.9 MMOL/L (ref 0.7–2)

## 2020-09-16 PROCEDURE — 97116 GAIT TRAINING THERAPY: CPT | Mod: GP | Performed by: REHABILITATION PRACTITIONER

## 2020-09-16 PROCEDURE — 40000905 ZZH STATISTIC WOC PT EDUCATION, > 60 MIN

## 2020-09-16 PROCEDURE — 25000132 ZZH RX MED GY IP 250 OP 250 PS 637: Performed by: PHYSICAL MEDICINE & REHABILITATION

## 2020-09-16 PROCEDURE — 12800006 ZZH R&B REHAB

## 2020-09-16 PROCEDURE — 36415 COLL VENOUS BLD VENIPUNCTURE: CPT | Performed by: PHYSICAL MEDICINE & REHABILITATION

## 2020-09-16 PROCEDURE — 83605 ASSAY OF LACTIC ACID: CPT | Performed by: PHYSICAL MEDICINE & REHABILITATION

## 2020-09-16 PROCEDURE — 97530 THERAPEUTIC ACTIVITIES: CPT | Mod: GP | Performed by: REHABILITATION PRACTITIONER

## 2020-09-16 PROCEDURE — 97535 SELF CARE MNGMENT TRAINING: CPT | Mod: GO

## 2020-09-16 PROCEDURE — 25000132 ZZH RX MED GY IP 250 OP 250 PS 637: Performed by: STUDENT IN AN ORGANIZED HEALTH CARE EDUCATION/TRAINING PROGRAM

## 2020-09-16 PROCEDURE — 97110 THERAPEUTIC EXERCISES: CPT | Mod: GP | Performed by: REHABILITATION PRACTITIONER

## 2020-09-16 PROCEDURE — 97530 THERAPEUTIC ACTIVITIES: CPT | Mod: GO

## 2020-09-16 RX ADMIN — ACETAMINOPHEN 975 MG: 325 TABLET, FILM COATED ORAL at 14:05

## 2020-09-16 RX ADMIN — ACETAMINOPHEN 975 MG: 325 TABLET, FILM COATED ORAL at 21:36

## 2020-09-16 RX ADMIN — APIXABAN 5 MG: 2.5 TABLET, FILM COATED ORAL at 08:47

## 2020-09-16 RX ADMIN — OXYCODONE HYDROCHLORIDE 5 MG: 5 TABLET ORAL at 14:05

## 2020-09-16 RX ADMIN — DOCUSATE SODIUM 100 MG: 100 CAPSULE, LIQUID FILLED ORAL at 21:37

## 2020-09-16 RX ADMIN — FERROUS SULFATE TAB 325 MG (65 MG ELEMENTAL FE) 325 MG: 325 (65 FE) TAB at 08:47

## 2020-09-16 RX ADMIN — SALINE NASAL SPRAY 1 SPRAY: 1.5 SOLUTION NASAL at 21:39

## 2020-09-16 RX ADMIN — Medication 1 G: at 21:40

## 2020-09-16 RX ADMIN — FERROUS SULFATE TAB 325 MG (65 MG ELEMENTAL FE) 325 MG: 325 (65 FE) TAB at 17:41

## 2020-09-16 RX ADMIN — MIRTAZAPINE 15 MG: 15 TABLET, FILM COATED ORAL at 21:36

## 2020-09-16 RX ADMIN — APIXABAN 5 MG: 2.5 TABLET, FILM COATED ORAL at 21:36

## 2020-09-16 RX ADMIN — MELATONIN TAB 3 MG 9 MG: 3 TAB at 21:36

## 2020-09-16 RX ADMIN — DOCUSATE SODIUM 100 MG: 100 CAPSULE, LIQUID FILLED ORAL at 08:47

## 2020-09-16 RX ADMIN — ACETAMINOPHEN 975 MG: 325 TABLET, FILM COATED ORAL at 08:47

## 2020-09-16 RX ADMIN — Medication 1 G: at 08:50

## 2020-09-16 RX ADMIN — LORATADINE 10 MG: 10 TABLET ORAL at 08:47

## 2020-09-16 RX ADMIN — MULTIPLE VITAMINS W/ MINERALS TAB 1 TABLET: TAB at 08:47
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
## 2020-09-16 NOTE — PLAN OF CARE
FOCUS/GOAL  Safety management    ASSESSMENT, INTERVENTIONS AND CONTINUING PLAN FOR GOAL:    Slept all through the night. No complaints of pain. No reports of SOB, chest pain or new numbness and tingling. Remains an assist of one with a FWW and WBAT for all extremities. Continue with plan of care.

## 2020-09-16 NOTE — PLAN OF CARE
Patient is A&OX4, able to make needs known. Generalized aching pain is minimal, managed with scheduled tylenol, and PRN oxycodone x1. Voiding in urinal without difficulties. Emptied colostomy x 2. All dressings intact. Continue with POC.

## 2020-09-16 NOTE — PLAN OF CARE
Pt triggered sepsis protocol due to elevated HR and RR. Lactate for sepsis was 1.0. Afebrile. Pt denies pain, chest pain or SOB. Pt reports right foot was more swollen after all the therapies today but got better when legs elevated in bed. Pt performing ankle pump exercise independently and continues to elevate legs on pillows.   Ileostomy pouch changed due to leakage at 1720, bag not emptied this shift since amount is small so far. Midline abdomen and right shin dressings changed.   Continue with POC.
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
## 2020-09-16 NOTE — PLAN OF CARE
PT: pt is  progressing well with all functional mobility. Pt limited today by increased L knee pain. Pt is SBA for all bed mob, and sit to stand to 2WW and or 4WW. Pt amb this AM up to 30'x 4 and pM pt amb with 4WW to and from PT gym needing one seated rest. AM session pt demo up and down 4 x 3 steps needing 1-3 min seated rest. Pt needing CGA with step over gait pattern.   Pt demo supine, seated and standing TE x 10.   ICE to L knee at end of session.

## 2020-09-17 ENCOUNTER — APPOINTMENT (OUTPATIENT)
Dept: PHYSICAL THERAPY | Facility: CLINIC | Age: 45
End: 2020-09-17
Payer: COMMERCIAL

## 2020-09-17 ENCOUNTER — APPOINTMENT (OUTPATIENT)
Dept: OCCUPATIONAL THERAPY | Facility: CLINIC | Age: 45
End: 2020-09-17
Payer: COMMERCIAL

## 2020-09-17 PROCEDURE — 25000132 ZZH RX MED GY IP 250 OP 250 PS 637: Performed by: STUDENT IN AN ORGANIZED HEALTH CARE EDUCATION/TRAINING PROGRAM

## 2020-09-17 PROCEDURE — 40000187 ZZH STATISTIC PATIENT MED CONFERENCE < 30 MIN: Performed by: STUDENT IN AN ORGANIZED HEALTH CARE EDUCATION/TRAINING PROGRAM

## 2020-09-17 PROCEDURE — 12800006 ZZH R&B REHAB

## 2020-09-17 PROCEDURE — 97116 GAIT TRAINING THERAPY: CPT | Mod: GP | Performed by: STUDENT IN AN ORGANIZED HEALTH CARE EDUCATION/TRAINING PROGRAM

## 2020-09-17 PROCEDURE — 97110 THERAPEUTIC EXERCISES: CPT | Mod: GP | Performed by: STUDENT IN AN ORGANIZED HEALTH CARE EDUCATION/TRAINING PROGRAM

## 2020-09-17 PROCEDURE — 97535 SELF CARE MNGMENT TRAINING: CPT | Mod: GO

## 2020-09-17 PROCEDURE — 97530 THERAPEUTIC ACTIVITIES: CPT | Mod: GP | Performed by: STUDENT IN AN ORGANIZED HEALTH CARE EDUCATION/TRAINING PROGRAM

## 2020-09-17 PROCEDURE — 40000183 ZZH STATISTIC PT MED CONFERENCE < 30 MIN: Performed by: STUDENT IN AN ORGANIZED HEALTH CARE EDUCATION/TRAINING PROGRAM

## 2020-09-17 RX ADMIN — ACETAMINOPHEN 975 MG: 325 TABLET, FILM COATED ORAL at 14:35

## 2020-09-17 RX ADMIN — Medication 1 G: at 08:21

## 2020-09-17 RX ADMIN — FERROUS SULFATE TAB 325 MG (65 MG ELEMENTAL FE) 325 MG: 325 (65 FE) TAB at 08:21

## 2020-09-17 RX ADMIN — DOCUSATE SODIUM 100 MG: 100 CAPSULE, LIQUID FILLED ORAL at 20:59

## 2020-09-17 RX ADMIN — MIRTAZAPINE 15 MG: 15 TABLET, FILM COATED ORAL at 21:00

## 2020-09-17 RX ADMIN — Medication 1 G: at 14:35

## 2020-09-17 RX ADMIN — LORATADINE 10 MG: 10 TABLET ORAL at 08:21

## 2020-09-17 RX ADMIN — ACETAMINOPHEN 975 MG: 325 TABLET, FILM COATED ORAL at 08:20

## 2020-09-17 RX ADMIN — ACETAMINOPHEN 975 MG: 325 TABLET, FILM COATED ORAL at 20:59

## 2020-09-17 RX ADMIN — MULTIPLE VITAMINS W/ MINERALS TAB 1 TABLET: TAB at 08:21

## 2020-09-17 RX ADMIN — MELATONIN TAB 3 MG 9 MG: 3 TAB at 20:59

## 2020-09-17 RX ADMIN — APIXABAN 5 MG: 2.5 TABLET, FILM COATED ORAL at 20:59

## 2020-09-17 RX ADMIN — Medication 1 G: at 20:58

## 2020-09-17 RX ADMIN — APIXABAN 5 MG: 2.5 TABLET, FILM COATED ORAL at 08:21

## 2020-09-17 RX ADMIN — FERROUS SULFATE TAB 325 MG (65 MG ELEMENTAL FE) 325 MG: 325 (65 FE) TAB at 18:54

## 2020-09-17 RX ADMIN — DOCUSATE SODIUM 100 MG: 100 CAPSULE, LIQUID FILLED ORAL at 08:21
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
## 2020-09-17 NOTE — PLAN OF CARE
FOCUS/GOAL  Wound care management, Mobility, Skin integrity, and Safety management    ASSESSMENT, INTERVENTIONS AND CONTINUING PLAN FOR GOAL:  Pt is alert and oriented x4. Denies pain, SOB, dizziness, n/v. VSS ex tachycardic. Sepsis protocol triggered this evening. Lactate results 0.9. Wound cares completed , dressings changed per POC. Pt tolerated well. ileostomy pouch intact and patent, no leakage noted. Continent of bladder using urinal at bedside. Assist of 1 with walker for transfers. Calls appropriately for needs.

## 2020-09-17 NOTE — PLAN OF CARE
Patient is A&O4, able to make needs known, using call light appropriately. Patient was up with an assist of 1 with walker. Manages clothing and andres cares with min assist of 1. Bowel sounds present, last BM 09/17, assist of 1 for emptying ileostomy, needed bag change today, bag was falling off, talked through steps of replacing bag, patient will need to start demonstrating bag changes, voiding spontaneously in the urinal. No complaint of dizziness, chest pain or SOB. Dressing replaced to right thigh, dressings CDI. Tolerating regular diet, no Nausea. Continue with bed alarms for safety. Continue POC.

## 2020-09-17 NOTE — PLAN OF CARE
FOCUS/GOAL  Safety management    ASSESSMENT, INTERVENTIONS AND CONTINUING PLAN FOR GOAL:    Pt slept all through the night. No complaints of pain. No reports of SOB, chest pain or new numbness and tingling. Remains Assist of one with a walker. Continue with plan of care.

## 2020-09-17 NOTE — PLAN OF CARE
OT: Pt reluctant to get out of bed as c/o LLE with activity.  He reports doing more amb and LE ex in PT today.  Th provided CGA for bed to w/c transfers and then back to bed after OT session.  Th assisted to don/doff socks as pt having too much pain with effort.  He came to the OT bathroom and considered adaptations for the toilet, however, pt deferred the transfer training with AE as he has a colostomy at this time and uses a urinal vs using the toilet.  He is considering installing a bar by the toilet but undecided re: other AE.  Th set pt up for shower transfer simulating his home environment, he is also considering a bath chair and bar but was unwilling to participate in transfer training again due to LLE pain.  Pt comfortable in bed after OT, no pain at rest.

## 2020-09-18 ENCOUNTER — APPOINTMENT (OUTPATIENT)
Dept: OCCUPATIONAL THERAPY | Facility: CLINIC | Age: 45
End: 2020-09-18
Payer: COMMERCIAL

## 2020-09-18 ENCOUNTER — APPOINTMENT (OUTPATIENT)
Dept: PHYSICAL THERAPY | Facility: CLINIC | Age: 45
End: 2020-09-18
Payer: COMMERCIAL

## 2020-09-18 PROCEDURE — 25000132 ZZH RX MED GY IP 250 OP 250 PS 637: Performed by: STUDENT IN AN ORGANIZED HEALTH CARE EDUCATION/TRAINING PROGRAM

## 2020-09-18 PROCEDURE — 12800006 ZZH R&B REHAB

## 2020-09-18 PROCEDURE — 97535 SELF CARE MNGMENT TRAINING: CPT | Mod: GO | Performed by: STUDENT IN AN ORGANIZED HEALTH CARE EDUCATION/TRAINING PROGRAM

## 2020-09-18 PROCEDURE — 97110 THERAPEUTIC EXERCISES: CPT | Mod: GP

## 2020-09-18 PROCEDURE — 97535 SELF CARE MNGMENT TRAINING: CPT | Mod: GO

## 2020-09-18 PROCEDURE — 97530 THERAPEUTIC ACTIVITIES: CPT | Mod: GO

## 2020-09-18 PROCEDURE — 40000903 ZZH STATISTIC WOC PT EDUCATION, 31-45 MIN

## 2020-09-18 PROCEDURE — 97530 THERAPEUTIC ACTIVITIES: CPT | Mod: GP

## 2020-09-18 RX ADMIN — ACETAMINOPHEN 975 MG: 325 TABLET, FILM COATED ORAL at 20:33

## 2020-09-18 RX ADMIN — ACETAMINOPHEN 975 MG: 325 TABLET, FILM COATED ORAL at 08:20

## 2020-09-18 RX ADMIN — APIXABAN 5 MG: 2.5 TABLET, FILM COATED ORAL at 08:20

## 2020-09-18 RX ADMIN — ACETAMINOPHEN 975 MG: 325 TABLET, FILM COATED ORAL at 13:29

## 2020-09-18 RX ADMIN — MULTIPLE VITAMINS W/ MINERALS TAB 1 TABLET: TAB at 08:21

## 2020-09-18 RX ADMIN — MELATONIN TAB 3 MG 9 MG: 3 TAB at 20:33

## 2020-09-18 RX ADMIN — DOCUSATE SODIUM 100 MG: 100 CAPSULE, LIQUID FILLED ORAL at 08:20

## 2020-09-18 RX ADMIN — FERROUS SULFATE TAB 325 MG (65 MG ELEMENTAL FE) 325 MG: 325 (65 FE) TAB at 08:21

## 2020-09-18 RX ADMIN — APIXABAN 5 MG: 2.5 TABLET, FILM COATED ORAL at 20:33

## 2020-09-18 RX ADMIN — Medication 1 G: at 10:20

## 2020-09-18 RX ADMIN — LORATADINE 10 MG: 10 TABLET ORAL at 08:20

## 2020-09-18 RX ADMIN — FERROUS SULFATE TAB 325 MG (65 MG ELEMENTAL FE) 325 MG: 325 (65 FE) TAB at 18:02
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
## 2020-09-18 NOTE — PLAN OF CARE
PT: pt is progressing well with all functional mobility. Still limited by fatigue and L LE weakness.  Pt is Mod I for all bed mob, SBA sit to stand with AD. Pt amb up to 170' with 4WW to PT gym. pt demo up and down 4 x 3 steps needing 1-3 min seated rest between each bout. Improved activity tolerance in PM session but still requires increased time and frequent rest between activities.

## 2020-09-18 NOTE — PROGRESS NOTES
Pt alert/oriented x4. VSS. Continent of urine, has ileostomy putting out stool. Pt emptied own ostomy, I only placed the cylinder. Changed right thigh, abdomen, and and right ankle dressings. Pt ambulates with 1 and walker. No complaints of pain besides dressing changes. Given tylenol scheduled.

## 2020-09-18 NOTE — PLAN OF CARE
FOCUS/GOAL  Bowel management, Bladder management, Pain management, Wound care management, and Mobility    ASSESSMENT, INTERVENTIONS AND CONTINUING PLAN FOR GOAL:    Alert and oriented X 4, calling appropriately to verbalize needs. VSS. Denies pain. Continent of bladder using urinal. Ostomy pouch intact and patent, no leakage noted.Wound cares dressing change completed per POC.  Patient shaved and showered during shift. Assist of 1 with walker for transfers. Calls light within reach. Alarms on for safety purpose. Continue with POC.

## 2020-09-18 NOTE — PLAN OF CARE
FOCUS/GOAL  Medical management    ASSESSMENT, INTERVENTIONS AND CONTINUING PLAN FOR GOAL:  Patient somnolent the first half of the shift, claimed not having enough sleep last night. Tachycardic ( at the beginning of the shift).... Therapy reported R thigh wound bleeding during therapy, Patient assessed,  a little pimple seen on the area with scant blood on his short. Bleeding had stopped and patient refused to put any kind of dressing on the area and declined changing the short..Ostomy education done by the [facility] nurse today. Ostomy output 200 ml. He denied pain.

## 2020-09-18 NOTE — PROGRESS NOTES
Team rounds yesterday. Discharge moved to Tues 09/222. HHA added to [facility] referral. Met with pt at bedside this morning. Pt presented with flat affect, was laying in bed comfortable. Denied any concerns or needs at this time. SWer discussed HHC and discharge date. Pt agreeable to HE (as discussed before) and RN, PT, OT and HHA. Pt will have a ride home from family. Denied additional needs. SW available if needs arise.     [facility]   Phone: [phone]  Fax :[phone]  RN, PT, OT and HHA     EDDIE Duval, [facility] Acute Rehab Unit   Phone: [phone]  I   Pager: [phone]

## 2020-09-18 NOTE — PLAN OF CARE
FOCUS/GOAL  Safety management    ASSESSMENT, INTERVENTIONS AND CONTINUING PLAN FOR GOAL:    Pt slept well through the night. Continent of bladder, no BM this shift. No pain. No reports of SOB, chest pain or new numbness and tingling. Remains an assist of one with a walker. Continue with plan of care.

## 2020-09-18 NOTE — PLAN OF CARE
Discharge Planner OT   Patient plan for discharge: Home with assist of family  Current status: SBA for self cares and short distance amb with walker. Pt requires rest breaks and is limited by fatigue and pain.   Barriers to return to prior living situation: Need to advance to MOD I and pt needs to be IND with managing colostomy bag for toileting. Family training to be scheduled when family gets back to us.   Recommendations for discharge: recommend assist for IADL, recommend RTS with arm rest, shower chair and reacher. Recommend HH OT   Rationale for recommendations: pt presentation       Entered by: Yovana Anthony 09/18/2020 9:09 AM

## 2020-09-18 NOTE — PLAN OF CARE
OT: Th provided SBA w/c and bed transfers and CGA for shower with chair and bar for safety due to LE weakness.  No c/o pain in LE's today but overall fatigue.  Pt sat to load clothes washer.  Activity tolerance limited but pt making slow progress.  Diaphoretic with effort today but patient feels he is improving.  Con't OT POC.

## 2020-09-19 ENCOUNTER — APPOINTMENT (OUTPATIENT)
Dept: PHYSICAL THERAPY | Facility: CLINIC | Age: 45
End: 2020-09-19
Payer: COMMERCIAL

## 2020-09-19 ENCOUNTER — APPOINTMENT (OUTPATIENT)
Dept: OCCUPATIONAL THERAPY | Facility: CLINIC | Age: 45
End: 2020-09-19
Payer: COMMERCIAL

## 2020-09-19 PROCEDURE — 25000132 ZZH RX MED GY IP 250 OP 250 PS 637: Performed by: STUDENT IN AN ORGANIZED HEALTH CARE EDUCATION/TRAINING PROGRAM

## 2020-09-19 PROCEDURE — 97530 THERAPEUTIC ACTIVITIES: CPT | Mod: GP

## 2020-09-19 PROCEDURE — 97535 SELF CARE MNGMENT TRAINING: CPT | Mod: GO

## 2020-09-19 PROCEDURE — 97110 THERAPEUTIC EXERCISES: CPT | Mod: GP

## 2020-09-19 PROCEDURE — 12800006 ZZH R&B REHAB

## 2020-09-19 RX ADMIN — Medication 1 G: at 08:44

## 2020-09-19 RX ADMIN — ACETAMINOPHEN 975 MG: 325 TABLET, FILM COATED ORAL at 15:07

## 2020-09-19 RX ADMIN — DOCUSATE SODIUM 100 MG: 100 CAPSULE, LIQUID FILLED ORAL at 08:41

## 2020-09-19 RX ADMIN — MULTIPLE VITAMINS W/ MINERALS TAB 1 TABLET: TAB at 08:41

## 2020-09-19 RX ADMIN — APIXABAN 5 MG: 2.5 TABLET, FILM COATED ORAL at 20:04

## 2020-09-19 RX ADMIN — APIXABAN 5 MG: 2.5 TABLET, FILM COATED ORAL at 08:41

## 2020-09-19 RX ADMIN — FERROUS SULFATE TAB 325 MG (65 MG ELEMENTAL FE) 325 MG: 325 (65 FE) TAB at 08:41

## 2020-09-19 RX ADMIN — OXYCODONE HYDROCHLORIDE 5 MG: 5 TABLET ORAL at 20:04

## 2020-09-19 RX ADMIN — Medication 1 G: at 15:07

## 2020-09-19 RX ADMIN — ACETAMINOPHEN 975 MG: 325 TABLET, FILM COATED ORAL at 20:04

## 2020-09-19 RX ADMIN — HYDROXYZINE HYDROCHLORIDE 25 MG: 25 TABLET, FILM COATED ORAL at 15:48

## 2020-09-19 RX ADMIN — LORATADINE 10 MG: 10 TABLET ORAL at 08:41

## 2020-09-19 RX ADMIN — MELATONIN TAB 3 MG 9 MG: 3 TAB at 20:08

## 2020-09-19 RX ADMIN — FERROUS SULFATE TAB 325 MG (65 MG ELEMENTAL FE) 325 MG: 325 (65 FE) TAB at 17:36

## 2020-09-19 RX ADMIN — ACETAMINOPHEN 975 MG: 325 TABLET, FILM COATED ORAL at 08:41

## 2020-09-19 NOTE — PLAN OF CARE
Patient stated that he he felt nauseated and had emesis after coming from outside with his bogdanried, denies b discomfort, ginger ale provided with ice, provider paged. Will continue POC

## 2020-09-19 NOTE — PLAN OF CARE
"OT:  Pt feeling poorly on approach this AM, wanting to rest but agreeable to reschedule for pm.  In PM, pt still feeling poorly with pain in R hand and \"like I'm burnt out\".  Pt expressing frustration with prolonged medical course.  RN alerted and will assess.      Pt's SO present, able to discuss discharge planning and FT.    Family training scheduled for 9/20 1:15 - 3:15.  SO and ex-wife to attend.  SO available in evening if nursing needing additional time.      Plan for discharge 9/22 in PM after independence day therapies in the AM.   "

## 2020-09-19 NOTE — PLAN OF CARE
FOCUS/GOAL  Medical management    ASSESSMENT, INTERVENTIONS AND CONTINUING PLAN FOR GOAL:  Pt denies pain and no sob noted. Emptied ileostomy bag x1 this shift Seen sleeping most of the time.
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
## 2020-09-19 NOTE — PLAN OF CARE
PT: -30 min in PM due to fatigue, nausea. Reviewed patient's progress towards goals for eventual safe discharge home.

## 2020-09-19 NOTE — PLAN OF CARE
Patient denies pain, temp this morning 99.7, re-checked 96.9, denies any nausea or abd dis comfort, tachycardic but within baseline. Patient appear not super motivated, endorsed lack of sleep last night, sitting at the edge of the bed and not making eye contact, declined dressing change and stated that he is tired to do anything. Denies any worries about impending discharge to home and stated that he is excited considering he had been in the hospital almost 12 weeks.Writer suggested going outside for fresh air and he was excited to that. Currently outside of the building assisted by his girlfriend. Patient emptied colostomy bag x 1,voiding spontaneously in the urinal and staff empties it will continue POC

## 2020-09-20 ENCOUNTER — APPOINTMENT (OUTPATIENT)
Dept: PHYSICAL THERAPY | Facility: CLINIC | Age: 45
End: 2020-09-20
Payer: COMMERCIAL

## 2020-09-20 ENCOUNTER — APPOINTMENT (OUTPATIENT)
Dept: OCCUPATIONAL THERAPY | Facility: CLINIC | Age: 45
End: 2020-09-20
Payer: COMMERCIAL

## 2020-09-20 PROCEDURE — 97530 THERAPEUTIC ACTIVITIES: CPT | Mod: GP

## 2020-09-20 PROCEDURE — 97110 THERAPEUTIC EXERCISES: CPT | Mod: GP

## 2020-09-20 PROCEDURE — 25000132 ZZH RX MED GY IP 250 OP 250 PS 637: Performed by: STUDENT IN AN ORGANIZED HEALTH CARE EDUCATION/TRAINING PROGRAM

## 2020-09-20 PROCEDURE — 97535 SELF CARE MNGMENT TRAINING: CPT | Mod: GO | Performed by: STUDENT IN AN ORGANIZED HEALTH CARE EDUCATION/TRAINING PROGRAM

## 2020-09-20 PROCEDURE — 12800006 ZZH R&B REHAB

## 2020-09-20 RX ORDER — ONDANSETRON 4 MG/1
4 TABLET, FILM COATED ORAL EVERY 6 HOURS PRN
Status: DISCONTINUED | OUTPATIENT
Start: 2020-09-20 | End: 2020-09-22 | Stop reason: HOSPADM

## 2020-09-20 RX ADMIN — ACETAMINOPHEN 975 MG: 325 TABLET, FILM COATED ORAL at 08:45

## 2020-09-20 RX ADMIN — FERROUS SULFATE TAB 325 MG (65 MG ELEMENTAL FE) 325 MG: 325 (65 FE) TAB at 17:33

## 2020-09-20 RX ADMIN — APIXABAN 5 MG: 2.5 TABLET, FILM COATED ORAL at 20:30

## 2020-09-20 RX ADMIN — MULTIPLE VITAMINS W/ MINERALS TAB 1 TABLET: TAB at 08:45

## 2020-09-20 RX ADMIN — OXYCODONE HYDROCHLORIDE 5 MG: 5 TABLET ORAL at 17:36

## 2020-09-20 RX ADMIN — MELATONIN TAB 3 MG 9 MG: 3 TAB at 20:30

## 2020-09-20 RX ADMIN — ACETAMINOPHEN 975 MG: 325 TABLET, FILM COATED ORAL at 20:30

## 2020-09-20 RX ADMIN — FERROUS SULFATE TAB 325 MG (65 MG ELEMENTAL FE) 325 MG: 325 (65 FE) TAB at 08:45

## 2020-09-20 RX ADMIN — APIXABAN 5 MG: 2.5 TABLET, FILM COATED ORAL at 08:45

## 2020-09-20 RX ADMIN — LORATADINE 10 MG: 10 TABLET ORAL at 08:45

## 2020-09-20 RX ADMIN — ACETAMINOPHEN 975 MG: 325 TABLET, FILM COATED ORAL at 15:30

## 2020-09-20 NOTE — PLAN OF CARE
PT: family training completed with pt's girlfriend and ex-wife present.  Continues to be limited by fatigue and back pain.  Pt is Mod I in his room. Pt amb up 95' x 2 with 4WW with seated rest between bouts. pt demo up and down 4 steps x 3 reps needing 2-3 min seated rest between each bout.  On track for discharge this week.

## 2020-09-20 NOTE — PROGRESS NOTES
Pt alert/oriented x4. VSS, although temperature low 95 degrees beginning of shift and then low grade temp 99 an hour later after warming pt up with blankets. Continent of urine, has ileostomy putting out liquid red stool (was green 9/18), but pt has been drinking fruit punch poweraid. Told pt to drink different flavor/color poweraid so not to confuse ostomy output with blood. Pt emptied own ostomy, I placed the cylinder for pt. Changed right thigh, abdomen, and and right ankle dressings. Pt ambulates with 1 and walker. No complaints of pain besides dressing changes. Given tylenol scheduled. Pt complained of intermittent nausea and anxiety throughout the shift, at beginning of shift gave atarax 25mg for anxiety and nausea with relief. Pt did have one emesis per pt account.

## 2020-09-20 NOTE — PLAN OF CARE
OT: Family training completed this PM. Pt's girlfriend and ex are able to assist pt as needed. Bathroom DME has been organized and will be issued at discharge. -10 fatigue.     This AM pt was advanced to MOD I in the room using walker in prep for discharge on Tues.

## 2020-09-20 NOTE — PLAN OF CARE
Patient denies pain, continent of bladder on the urinal. Patient able to empty ileostomy but preferred writer to do the emptying today. Patient stated that he felt nausea when emptying the ileostomy, writer proceeded to empty the bag and patient started dry heaving, no vomiting occurred. Patient's girl friend and ex wife taught how to empty the ileostomy and attach the bag, they return demonstrate with good effect.  Teaching also completed for abd, right shin and thigh wound dressing,, they also return demonstrate with good effect. Patient is currently ind in his romm will continue POC

## 2020-09-20 NOTE — PLAN OF CARE
FOCUS/GOAL  Medical management    ASSESSMENT, INTERVENTIONS AND CONTINUING PLAN FOR GOAL:  Pt denies nausea. No c/o chills,T-98.2,oral. Ileostomy output of 300cc,soft brownished colored stool. Pt used urinal when offered, voided 320 tea- colored urine. Coughed out to yellowish phlegm x1.Seen sleeping most of the time.

## 2020-09-20 NOTE — PROGRESS NOTES
Pt alert/oriented x4. VSS. HR still high in 120's but asymptomatic, continuing to monitor. Educated patient and family to ask questions about heart rate monitoring at home come discharge since does get into 140's-150's while doing therapies, moving around etc. Pt got wound care during day shift. Has new redness hot to touch on left calf, near old incision. Having some back/shoulder pain and gave oxycodone 5mg around 1630. Made Mod I today, pt continent of urine. Has ileostomy but gets nauseous when emptying. Suggested to patient and family member use of diluted essential oil peppermint to put under nose, may help with smell before and during emptying if intolerable.

## 2020-09-21 ENCOUNTER — APPOINTMENT (OUTPATIENT)
Dept: OCCUPATIONAL THERAPY | Facility: CLINIC | Age: 45
End: 2020-09-21
Payer: COMMERCIAL

## 2020-09-21 ENCOUNTER — APPOINTMENT (OUTPATIENT)
Dept: PHYSICAL THERAPY | Facility: CLINIC | Age: 45
End: 2020-09-21
Payer: COMMERCIAL

## 2020-09-21 LAB
BASOPHILS # BLD AUTO: 0.1 10E9/L (ref 0–0.2)
BASOPHILS NFR BLD AUTO: 0.8 %
DIFFERENTIAL METHOD BLD: ABNORMAL
EOSINOPHIL # BLD AUTO: 0.4 10E9/L (ref 0–0.7)
EOSINOPHIL NFR BLD AUTO: 3.7 %
ERYTHROCYTE [DISTWIDTH] IN BLOOD BY AUTOMATED COUNT: 19.6 % (ref 10–15)
HCT VFR BLD AUTO: 37.2 % (ref 40–53)
HGB BLD-MCNC: 11.2 G/DL (ref 13.3–17.7)
IMM GRANULOCYTES # BLD: 0 10E9/L (ref 0–0.4)
IMM GRANULOCYTES NFR BLD: 0.3 %
LYMPHOCYTES # BLD AUTO: 1.5 10E9/L (ref 0.8–5.3)
LYMPHOCYTES NFR BLD AUTO: 14.4 %
MCH RBC QN AUTO: 23.2 PG (ref 26.5–33)
MCHC RBC AUTO-ENTMCNC: 30.1 G/DL (ref 31.5–36.5)
MCV RBC AUTO: 77 FL (ref 78–100)
MONOCYTES # BLD AUTO: 0.9 10E9/L (ref 0–1.3)
MONOCYTES NFR BLD AUTO: 8.7 %
NEUTROPHILS # BLD AUTO: 7.3 10E9/L (ref 1.6–8.3)
NEUTROPHILS NFR BLD AUTO: 72.1 %
NRBC # BLD AUTO: 0 10*3/UL
NRBC BLD AUTO-RTO: 0 /100
PLATELET # BLD AUTO: 564 10E9/L (ref 150–450)
RBC # BLD AUTO: 4.82 10E12/L (ref 4.4–5.9)
WBC # BLD AUTO: 10.1 10E9/L (ref 4–11)

## 2020-09-21 PROCEDURE — 25000132 ZZH RX MED GY IP 250 OP 250 PS 637: Performed by: STUDENT IN AN ORGANIZED HEALTH CARE EDUCATION/TRAINING PROGRAM

## 2020-09-21 PROCEDURE — 25000125 ZZHC RX 250: Performed by: PHYSICAL MEDICINE & REHABILITATION

## 2020-09-21 PROCEDURE — 97535 SELF CARE MNGMENT TRAINING: CPT | Mod: GO | Performed by: OCCUPATIONAL THERAPIST

## 2020-09-21 PROCEDURE — 97110 THERAPEUTIC EXERCISES: CPT | Mod: GO | Performed by: OCCUPATIONAL THERAPIST

## 2020-09-21 PROCEDURE — 36415 COLL VENOUS BLD VENIPUNCTURE: CPT | Performed by: PHYSICAL MEDICINE & REHABILITATION

## 2020-09-21 PROCEDURE — 85025 COMPLETE CBC W/AUTO DIFF WBC: CPT | Performed by: PHYSICAL MEDICINE & REHABILITATION

## 2020-09-21 PROCEDURE — 97110 THERAPEUTIC EXERCISES: CPT | Mod: GP

## 2020-09-21 PROCEDURE — 25000132 ZZH RX MED GY IP 250 OP 250 PS 637: Performed by: PHYSICAL MEDICINE & REHABILITATION

## 2020-09-21 PROCEDURE — 97530 THERAPEUTIC ACTIVITIES: CPT | Mod: GP

## 2020-09-21 PROCEDURE — 12800006 ZZH R&B REHAB

## 2020-09-21 PROCEDURE — 97530 THERAPEUTIC ACTIVITIES: CPT | Mod: GO | Performed by: OCCUPATIONAL THERAPIST

## 2020-09-21 RX ORDER — FERROUS SULFATE 325(65) MG
325 TABLET ORAL 2 TIMES DAILY
Qty: 60 TABLET | Refills: 0 | Status: SHIPPED | OUTPATIENT
Start: 2020-09-21 | End: 2020-10-21

## 2020-09-21 RX ORDER — MULTIPLE VITAMINS W/ MINERALS TAB 9MG-400MCG
1 TAB ORAL DAILY
Qty: 30 TABLET | Refills: 0 | Status: SHIPPED | OUTPATIENT
Start: 2020-09-21 | End: 2020-10-21

## 2020-09-21 RX ORDER — CEPHALEXIN 500 MG/1
500 CAPSULE ORAL EVERY 6 HOURS
Qty: 24 CAPSULE | Refills: 0 | Status: SHIPPED | OUTPATIENT
Start: 2020-09-21 | End: 2020-09-27

## 2020-09-21 RX ORDER — MUPIROCIN 20 MG/G
OINTMENT TOPICAL 2 TIMES DAILY
Qty: 30 G | Refills: 0 | Status: SHIPPED | OUTPATIENT
Start: 2020-09-21 | End: 2021-01-01

## 2020-09-21 RX ORDER — MUPIROCIN 20 MG/G
OINTMENT TOPICAL 2 TIMES DAILY
Status: DISCONTINUED | OUTPATIENT
Start: 2020-09-21 | End: 2020-09-21

## 2020-09-21 RX ORDER — ASPIRIN 81 MG
100 TABLET, DELAYED RELEASE (ENTERIC COATED) ORAL 2 TIMES DAILY
Qty: 60 TABLET | Refills: 0 | Status: SHIPPED | OUTPATIENT
Start: 2020-09-21 | End: 2020-10-21

## 2020-09-21 RX ORDER — MUPIROCIN 20 MG/G
OINTMENT TOPICAL 2 TIMES DAILY
Status: DISCONTINUED | OUTPATIENT
Start: 2020-09-21 | End: 2020-09-22 | Stop reason: HOSPADM

## 2020-09-21 RX ORDER — CEPHALEXIN 500 MG/1
500 CAPSULE ORAL EVERY 6 HOURS SCHEDULED
Status: DISCONTINUED | OUTPATIENT
Start: 2020-09-21 | End: 2020-09-22 | Stop reason: HOSPADM

## 2020-09-21 RX ORDER — CEPHALEXIN 500 MG/1
500 CAPSULE ORAL EVERY 6 HOURS SCHEDULED
Status: DISCONTINUED | OUTPATIENT
Start: 2020-09-21 | End: 2020-09-21

## 2020-09-21 RX ORDER — ACETAMINOPHEN 325 MG/1
975 TABLET ORAL 3 TIMES DAILY
Qty: 63 TABLET | Refills: 0 | Status: SHIPPED | OUTPATIENT
Start: 2020-09-21 | End: 2020-09-28

## 2020-09-21 RX ADMIN — OXYCODONE HYDROCHLORIDE 5 MG: 5 TABLET ORAL at 22:11

## 2020-09-21 RX ADMIN — MULTIPLE VITAMINS W/ MINERALS TAB 1 TABLET: TAB at 09:24

## 2020-09-21 RX ADMIN — FERROUS SULFATE TAB 325 MG (65 MG ELEMENTAL FE) 325 MG: 325 (65 FE) TAB at 09:24

## 2020-09-21 RX ADMIN — MIRTAZAPINE 15 MG: 15 TABLET, FILM COATED ORAL at 22:03

## 2020-09-21 RX ADMIN — CEPHALEXIN 500 MG: 500 CAPSULE ORAL at 19:11

## 2020-09-21 RX ADMIN — DOCUSATE SODIUM 100 MG: 100 CAPSULE, LIQUID FILLED ORAL at 22:03

## 2020-09-21 RX ADMIN — ACETAMINOPHEN 975 MG: 325 TABLET, FILM COATED ORAL at 19:11

## 2020-09-21 RX ADMIN — MUPIROCIN: 20 OINTMENT TOPICAL at 19:19

## 2020-09-21 RX ADMIN — FERROUS SULFATE TAB 325 MG (65 MG ELEMENTAL FE) 325 MG: 325 (65 FE) TAB at 19:11

## 2020-09-21 RX ADMIN — APIXABAN 5 MG: 2.5 TABLET, FILM COATED ORAL at 09:24

## 2020-09-21 RX ADMIN — LORATADINE 10 MG: 10 TABLET ORAL at 09:25

## 2020-09-21 RX ADMIN — MELATONIN TAB 3 MG 9 MG: 3 TAB at 22:03

## 2020-09-21 RX ADMIN — ACETAMINOPHEN 975 MG: 325 TABLET, FILM COATED ORAL at 09:23

## 2020-09-21 RX ADMIN — DOCUSATE SODIUM 100 MG: 100 CAPSULE, LIQUID FILLED ORAL at 09:24

## 2020-09-21 RX ADMIN — APIXABAN 5 MG: 2.5 TABLET, FILM COATED ORAL at 22:03

## 2020-09-21 RX ADMIN — ACETAMINOPHEN 975 MG: 325 TABLET, FILM COATED ORAL at 14:47

## 2020-09-21 NOTE — PROGRESS NOTES
Informed MD that pt is getting set up with a new PCP and MD to sign HHC orders until PCP is established. HHC all set up, in AVS and pt aware and agreeable to discharge plans. Discharge home tomorrow, s/o will transport. No additional SW needs at this time.       [facility]   Phone: [phone]  Fax :[phone]  RN, PT, OT and HHA    Mai Carter, EDDIE, [facility] Acute Rehab Unit   Phone: [phone]  I   Pager: [phone]

## 2020-09-21 NOTE — PLAN OF CARE
FOCUS/GOAL  Medical management    ASSESSMENT, INTERVENTIONS AND CONTINUING PLAN FOR GOAL:  Pt has no complain of pain or sob. Seen sleeping comfortably during rounds.
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
## 2020-09-21 NOTE — DISCHARGE INSTRUCTIONS
Follow up Appointments:    - Establish primary care  You are scheduled to see Nicholas Santos CNP on Friday, September 25th at 10:00 AM.    Address  [address] [address]  Phone   [phone]    - Orthopedics  You are scheduled to see Dr. Alvarez on Tuesday, October 13th at 10:30 AM.    Address  [facility]                          [address] [place] [address]   Phone   [phone]      [facility]   Phone: [phone]  Fax :[phone]  Nurse, physical therapy, occupational therapy and home health aide

## 2020-09-21 NOTE — PLAN OF CARE
FOCUS/GOAL  Bowel management, Medication management, Wound care management, and Discharge planning    ASSESSMENT, INTERVENTIONS AND CONTINUING PLAN FOR GOAL:  Pt is alert and oriented x4. Denies SOB, nausea, numbness/tingling. Staff emptying and also helping patient empty colostomy bag. Stool is loose and greenish brown. Pt notified RN of circular inflamed area on upper left thigh near groin with scab in center. MD notified and went and looked at this. Scab scraped off, puss removed and bandage applied. WBC slightly increased from previous, but still WNL. Oral abx ordered to start tonight along with antibacterial ointment. Plan for discharge tomorrow.

## 2020-09-21 NOTE — PLAN OF CARE
Discharge Planner OT   Patient plan for discharge: Home with family assist  Current status: Pt completing bed mobility IND. Pt completing STS with FWW and SBA. Pt completing UE theraband and foam block HEP exercises, 10 reps each while seated. Pt completing 2 1-1.5 min standing UE exercise for increased endurance and 1 while seated. Pt reporting fatigue, SOB, and increased HR with endurance activity. Pt ambulating ~100 ft with 4WW. Pt completing car transfer with supervision after therapist education on techniques.   Barriers to return to prior living situation: Decreased endurance, deconditioning, pain  Recommendations for discharge: Home with assist  Rationale for recommendations: Pt will likely benefit from assist at home with IADL.       Entered by: Elvira Martin 09/21/2020 2:58 PM

## 2020-09-22 ENCOUNTER — RECORDS - HEALTHEAST (OUTPATIENT)
Dept: ADMINISTRATIVE | Facility: OTHER | Age: 45
End: 2020-09-22

## 2020-09-22 ENCOUNTER — APPOINTMENT (OUTPATIENT)
Dept: PHYSICAL THERAPY | Facility: CLINIC | Age: 45
End: 2020-09-22
Payer: COMMERCIAL

## 2020-09-22 ENCOUNTER — APPOINTMENT (OUTPATIENT)
Dept: OCCUPATIONAL THERAPY | Facility: CLINIC | Age: 45
End: 2020-09-22
Payer: COMMERCIAL

## 2020-09-22 VITALS
SYSTOLIC BLOOD PRESSURE: 104 MMHG | HEART RATE: 107 BPM | OXYGEN SATURATION: 94 % | DIASTOLIC BLOOD PRESSURE: 66 MMHG | RESPIRATION RATE: 16 BRPM | TEMPERATURE: 98.7 F

## 2020-09-22 PROCEDURE — 97530 THERAPEUTIC ACTIVITIES: CPT | Mod: GP | Performed by: PHYSICAL THERAPIST

## 2020-09-22 PROCEDURE — 97535 SELF CARE MNGMENT TRAINING: CPT | Mod: GO | Performed by: STUDENT IN AN ORGANIZED HEALTH CARE EDUCATION/TRAINING PROGRAM

## 2020-09-22 PROCEDURE — 25000132 ZZH RX MED GY IP 250 OP 250 PS 637: Performed by: STUDENT IN AN ORGANIZED HEALTH CARE EDUCATION/TRAINING PROGRAM

## 2020-09-22 PROCEDURE — G0463 HOSPITAL OUTPT CLINIC VISIT: HCPCS

## 2020-09-22 PROCEDURE — 25000132 ZZH RX MED GY IP 250 OP 250 PS 637: Performed by: PHYSICAL MEDICINE & REHABILITATION

## 2020-09-22 RX ADMIN — Medication 1 G: at 13:06

## 2020-09-22 RX ADMIN — ACETAMINOPHEN 975 MG: 325 TABLET, FILM COATED ORAL at 13:06

## 2020-09-22 RX ADMIN — MUPIROCIN: 20 OINTMENT TOPICAL at 09:48

## 2020-09-22 RX ADMIN — FERROUS SULFATE TAB 325 MG (65 MG ELEMENTAL FE) 325 MG: 325 (65 FE) TAB at 09:34

## 2020-09-22 RX ADMIN — APIXABAN 5 MG: 2.5 TABLET, FILM COATED ORAL at 09:47

## 2020-09-22 RX ADMIN — MULTIPLE VITAMINS W/ MINERALS TAB 1 TABLET: TAB at 09:40

## 2020-09-22 RX ADMIN — LORATADINE 10 MG: 10 TABLET ORAL at 09:35

## 2020-09-22 RX ADMIN — ACETAMINOPHEN 975 MG: 325 TABLET, FILM COATED ORAL at 09:34

## 2020-09-22 RX ADMIN — CEPHALEXIN 500 MG: 500 CAPSULE ORAL at 13:06

## 2020-09-22 RX ADMIN — DOCUSATE SODIUM 100 MG: 100 CAPSULE, LIQUID FILLED ORAL at 09:34

## 2020-09-22 RX ADMIN — CEPHALEXIN 500 MG: 500 CAPSULE ORAL at 06:09

## 2020-09-22 NOTE — PLAN OF CARE
FOCUS/GOAL  Wound care management, Skin integrity, and Safety management    ASSESSMENT, INTERVENTIONS AND CONTINUING PLAN FOR GOAL:  Pt is A/O, able to use call light and make needs known to staff, assistive devices within reach. Complained of pain and soreness all over his body, scheduled Tylenol and PRN oxycodone were given and were effective. Colostomy bag in place, pt asked for help in emptying the bag. Continent of urine. Dressing changes done on lower median abdomen, left thigh, right thigh and right shin. For discharge tomorrow. Will continue to monitor.
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
## 2020-09-22 NOTE — PLAN OF CARE
"FOCUS/GOAL  Medical management    ASSESSMENT, INTERVENTIONS AND CONTINUING PLAN FOR GOAL:  Pt is alert and oriented. No complaints of pain. Mod I in room with walker. Ileostomy WDL. Continent of bladder. Pt declined midnight kefflex. RN educated pt on what it was for and pt stated \"I don't need it\". Pt did take 0600 dose of kefflex. Appeared to sleep well overnight.    "
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
## 2020-09-22 NOTE — PLAN OF CARE
Occupational Therapy Discharge Summary    Reason for therapy discharge:    Discharged to home with home therapy.    Progress towards therapy goal(s). See goals on Care Plan in [facility] electronic health record for goal details.  Goals partially met.  Barriers to achieving goals:   discharge from facility.    Therapy recommendation(s):    Continued therapy is recommended.  Rationale/Recommendations:  Pt is MOD I with self cares, did not address IADL due to endurance for activity. Pt will have HH OT to follow up with activities. Pt will have assist at home. Pt will have SBA for shower. Pt was issued shower chair and RTS with arm rests.

## 2020-09-22 NOTE — PLAN OF CARE
Denies pain, Vss, ate god at breakfast and lunch. Had a shower this morning, Woc currently in patients room to change to change and teach the patient how to care for his ileostomy. On track to discharge today at about 1630, will continue POC

## 2020-09-22 NOTE — PROGRESS NOTES
Pt was discharged at 1710, in fair condition via w/c accompanied by girlfriend. Discharge instructions given and medications reviewed. Questions answered.

## 2020-09-23 NOTE — PLAN OF CARE
Physical Therapy Discharge Summary    Reason for therapy discharge:    Discharged to home with home therapy.    Progress towards therapy goal(s). See goals on Care Plan in [facility] electronic health record for goal details.  Goals met    Therapy recommendation(s):    Continued therapy is recommended.  Rationale/Recommendations:  will benefit from continued skilled PT in the home environment to assess safety, progress mobility skills, improve strength & activity tolerance. Will benefit from OP pulmonary rehab in the future to address deconditioning

## 2020-09-24 ENCOUNTER — COMMUNICATION - HEALTHEAST (OUTPATIENT)
Dept: HOME HEALTH SERVICES | Facility: HOME HEALTH | Age: 45
End: 2020-09-24

## 2020-09-24 ENCOUNTER — HOME CARE/HOSPICE - HEALTHEAST (OUTPATIENT)
Dept: HOME HEALTH SERVICES | Facility: HOME HEALTH | Age: 45
End: 2020-09-24

## 2020-09-25 ENCOUNTER — OFFICE VISIT - HEALTHEAST (OUTPATIENT)
Dept: FAMILY MEDICINE | Facility: CLINIC | Age: 45
End: 2020-09-25

## 2020-09-25 ENCOUNTER — HOME CARE/HOSPICE - HEALTHEAST (OUTPATIENT)
Dept: HOME HEALTH SERVICES | Facility: HOME HEALTH | Age: 45
End: 2020-09-25

## 2020-09-25 DIAGNOSIS — R60.0 LOWER EXTREMITY EDEMA: ICD-10-CM

## 2020-09-25 DIAGNOSIS — Z93.3 COLOSTOMY PRESENT (H): ICD-10-CM

## 2020-09-25 DIAGNOSIS — K51.919 ULCERATIVE COLITIS WITH COMPLICATION, UNSPECIFIED LOCATION (H): ICD-10-CM

## 2020-09-25 ASSESSMENT — MIFFLIN-ST. JEOR: SCORE: 1776.54

## 2020-09-28 ENCOUNTER — HOME CARE/HOSPICE - HEALTHEAST (OUTPATIENT)
Dept: HOME HEALTH SERVICES | Facility: HOME HEALTH | Age: 45
End: 2020-09-28

## 2020-09-30 ENCOUNTER — HOME CARE/HOSPICE - HEALTHEAST (OUTPATIENT)
Dept: HOME HEALTH SERVICES | Facility: HOME HEALTH | Age: 45
End: 2020-09-30

## 2020-09-30 ENCOUNTER — COMMUNICATION - HEALTHEAST (OUTPATIENT)
Dept: HOME HEALTH SERVICES | Facility: HOME HEALTH | Age: 45
End: 2020-09-30

## 2020-10-01 ENCOUNTER — HOME CARE/HOSPICE - HEALTHEAST (OUTPATIENT)
Dept: HOME HEALTH SERVICES | Facility: HOME HEALTH | Age: 45
End: 2020-10-01

## 2020-10-02 ENCOUNTER — HOME CARE/HOSPICE - HEALTHEAST (OUTPATIENT)
Dept: HOME HEALTH SERVICES | Facility: HOME HEALTH | Age: 45
End: 2020-10-02

## 2020-10-06 ENCOUNTER — HOME CARE/HOSPICE - HEALTHEAST (OUTPATIENT)
Dept: HOME HEALTH SERVICES | Facility: HOME HEALTH | Age: 45
End: 2020-10-06

## 2020-10-06 ENCOUNTER — COMMUNICATION - HEALTHEAST (OUTPATIENT)
Dept: FAMILY MEDICINE | Facility: CLINIC | Age: 45
End: 2020-10-06

## 2020-10-08 ENCOUNTER — HOME CARE/HOSPICE - HEALTHEAST (OUTPATIENT)
Dept: HOME HEALTH SERVICES | Facility: HOME HEALTH | Age: 45
End: 2020-10-08

## 2020-10-08 ENCOUNTER — COMMUNICATION - HEALTHEAST (OUTPATIENT)
Dept: HOME HEALTH SERVICES | Facility: HOME HEALTH | Age: 45
End: 2020-10-08

## 2020-10-08 ENCOUNTER — RECORDS - HEALTHEAST (OUTPATIENT)
Dept: ADMINISTRATIVE | Facility: OTHER | Age: 45
End: 2020-10-08

## 2020-10-09 ENCOUNTER — HOME CARE/HOSPICE - HEALTHEAST (OUTPATIENT)
Dept: HOME HEALTH SERVICES | Facility: HOME HEALTH | Age: 45
End: 2020-10-09

## 2020-10-11 ENCOUNTER — COMMUNICATION - HEALTHEAST (OUTPATIENT)
Dept: HOME HEALTH SERVICES | Facility: HOME HEALTH | Age: 45
End: 2020-10-11

## 2020-10-12 ENCOUNTER — COMMUNICATION - HEALTHEAST (OUTPATIENT)
Dept: FAMILY MEDICINE | Facility: CLINIC | Age: 45
End: 2020-10-12

## 2020-10-12 ENCOUNTER — HOME CARE/HOSPICE - HEALTHEAST (OUTPATIENT)
Dept: HOME HEALTH SERVICES | Facility: HOME HEALTH | Age: 45
End: 2020-10-12

## 2020-10-13 ENCOUNTER — RECORDS - HEALTHEAST (OUTPATIENT)
Dept: ADMINISTRATIVE | Facility: OTHER | Age: 45
End: 2020-10-13

## 2020-10-15 ENCOUNTER — HOME CARE/HOSPICE - HEALTHEAST (OUTPATIENT)
Dept: HOME HEALTH SERVICES | Facility: HOME HEALTH | Age: 45
End: 2020-10-15

## 2020-10-16 ENCOUNTER — OFFICE VISIT (OUTPATIENT)
Dept: WOUND CARE | Facility: CLINIC | Age: 45
End: 2020-10-16
Payer: COMMERCIAL

## 2020-10-16 DIAGNOSIS — Z93.2 ILEOSTOMY STATUS (H): Primary | ICD-10-CM

## 2020-10-16 PROCEDURE — 99211 OFF/OP EST MAY X REQ PHY/QHP: CPT
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
## 2020-10-16 NOTE — PATIENT INSTRUCTIONS
[place]  Soft convex barrier 84149 for the 57 mm ring    Or in the future 24963 to match the pouches that are also have a 44 mm ring  Matching pouch number [id number]    [place] starter kit [phone]

## 2020-10-19 ENCOUNTER — HOME CARE/HOSPICE - HEALTHEAST (OUTPATIENT)
Dept: HOME HEALTH SERVICES | Facility: HOME HEALTH | Age: 45
End: 2020-10-19

## 2020-10-20 ENCOUNTER — COMMUNICATION - HEALTHEAST (OUTPATIENT)
Dept: FAMILY MEDICINE | Facility: CLINIC | Age: 45
End: 2020-10-20

## 2020-10-20 DIAGNOSIS — I82.4Y9 ACUTE DEEP VEIN THROMBOSIS (DVT) OF PROXIMAL VEIN OF LOWER EXTREMITY, UNSPECIFIED LATERALITY (H): ICD-10-CM

## 2020-10-20 DIAGNOSIS — Z86.711 HISTORY OF PULMONARY EMBOLISM: ICD-10-CM

## 2020-10-21 ENCOUNTER — COMMUNICATION - HEALTHEAST (OUTPATIENT)
Dept: FAMILY MEDICINE | Facility: CLINIC | Age: 45
End: 2020-10-21

## 2020-10-22 ENCOUNTER — HOME CARE/HOSPICE - HEALTHEAST (OUTPATIENT)
Dept: HOME HEALTH SERVICES | Facility: HOME HEALTH | Age: 45
End: 2020-10-22

## 2020-10-22 ENCOUNTER — COMMUNICATION - HEALTHEAST (OUTPATIENT)
Dept: HOME HEALTH SERVICES | Facility: HOME HEALTH | Age: 45
End: 2020-10-22

## 2020-10-26 ENCOUNTER — HOME CARE/HOSPICE - HEALTHEAST (OUTPATIENT)
Dept: HOME HEALTH SERVICES | Facility: HOME HEALTH | Age: 45
End: 2020-10-26

## 2020-10-29 ENCOUNTER — HOME CARE/HOSPICE - HEALTHEAST (OUTPATIENT)
Dept: HOME HEALTH SERVICES | Facility: HOME HEALTH | Age: 45
End: 2020-10-29

## 2020-11-02 ENCOUNTER — HOME CARE/HOSPICE - HEALTHEAST (OUTPATIENT)
Dept: HOME HEALTH SERVICES | Facility: HOME HEALTH | Age: 45
End: 2020-11-02

## 2020-11-05 ENCOUNTER — HOME CARE/HOSPICE - HEALTHEAST (OUTPATIENT)
Dept: HOME HEALTH SERVICES | Facility: HOME HEALTH | Age: 45
End: 2020-11-05

## 2020-11-05 ENCOUNTER — COMMUNICATION - HEALTHEAST (OUTPATIENT)
Dept: HOME HEALTH SERVICES | Facility: HOME HEALTH | Age: 45
End: 2020-11-05

## 2020-11-09 ENCOUNTER — HOME CARE/HOSPICE - HEALTHEAST (OUTPATIENT)
Dept: HOME HEALTH SERVICES | Facility: HOME HEALTH | Age: 45
End: 2020-11-09

## 2020-11-12 ENCOUNTER — HOME CARE/HOSPICE - HEALTHEAST (OUTPATIENT)
Dept: HOME HEALTH SERVICES | Facility: HOME HEALTH | Age: 45
End: 2020-11-12

## 2020-11-16 ENCOUNTER — HOME CARE/HOSPICE - HEALTHEAST (OUTPATIENT)
Dept: HOME HEALTH SERVICES | Facility: HOME HEALTH | Age: 45
End: 2020-11-16

## 2020-11-22 ENCOUNTER — COMMUNICATION - HEALTHEAST (OUTPATIENT)
Dept: HOME HEALTH SERVICES | Facility: HOME HEALTH | Age: 45
End: 2020-11-22

## 2020-11-24 ENCOUNTER — HOME CARE/HOSPICE - HEALTHEAST (OUTPATIENT)
Dept: HOME HEALTH SERVICES | Facility: HOME HEALTH | Age: 45
End: 2020-11-24

## 2020-11-27 ENCOUNTER — HOME CARE/HOSPICE - HEALTHEAST (OUTPATIENT)
Dept: HOME HEALTH SERVICES | Facility: HOME HEALTH | Age: 45
End: 2020-11-27

## 2020-12-01 ENCOUNTER — COMMUNICATION - HEALTHEAST (OUTPATIENT)
Dept: HOME HEALTH SERVICES | Facility: HOME HEALTH | Age: 45
End: 2020-12-01

## 2020-12-01 ENCOUNTER — HOME CARE/HOSPICE - HEALTHEAST (OUTPATIENT)
Dept: HOME HEALTH SERVICES | Facility: HOME HEALTH | Age: 45
End: 2020-12-01

## 2020-12-01 DIAGNOSIS — L08.9 CHRONIC WOUND INFECTION OF ABDOMEN, INITIAL ENCOUNTER: ICD-10-CM

## 2020-12-01 DIAGNOSIS — S31.109A CHRONIC WOUND INFECTION OF ABDOMEN, INITIAL ENCOUNTER: ICD-10-CM

## 2021-01-01 ENCOUNTER — COMMUNICATION - HEALTHEAST (OUTPATIENT)
Dept: FAMILY MEDICINE | Facility: CLINIC | Age: 46
End: 2021-01-01

## 2021-01-01 ENCOUNTER — RECORDS - HEALTHEAST (OUTPATIENT)
Dept: ADMINISTRATIVE | Facility: OTHER | Age: 46
End: 2021-01-01

## 2021-01-01 ENCOUNTER — TELEPHONE (OUTPATIENT)
Dept: FAMILY MEDICINE | Facility: CLINIC | Age: 46
End: 2021-01-01

## 2021-01-01 ENCOUNTER — NURSE TRIAGE (OUTPATIENT)
Dept: NURSING | Facility: CLINIC | Age: 46
End: 2021-01-01

## 2021-01-01 ENCOUNTER — OFFICE VISIT - HEALTHEAST (OUTPATIENT)
Dept: FAMILY MEDICINE | Facility: CLINIC | Age: 46
End: 2021-01-01

## 2021-01-01 ENCOUNTER — ANCILLARY PROCEDURE (OUTPATIENT)
Dept: GENERAL RADIOLOGY | Facility: CLINIC | Age: 46
End: 2021-01-01
Attending: FAMILY MEDICINE
Payer: COMMERCIAL

## 2021-01-01 ENCOUNTER — LAB (OUTPATIENT)
Dept: LAB | Facility: CLINIC | Age: 46
End: 2021-01-01
Payer: COMMERCIAL

## 2021-01-01 ENCOUNTER — HEALTH MAINTENANCE LETTER (OUTPATIENT)
Age: 46
End: 2021-01-01

## 2021-01-01 ENCOUNTER — TRANSFERRED RECORDS (OUTPATIENT)
Dept: HEALTH INFORMATION MANAGEMENT | Facility: CLINIC | Age: 46
End: 2021-01-01
Payer: COMMERCIAL

## 2021-01-01 ENCOUNTER — OFFICE VISIT (OUTPATIENT)
Dept: FAMILY MEDICINE | Facility: CLINIC | Age: 46
End: 2021-01-01
Payer: COMMERCIAL

## 2021-01-01 ENCOUNTER — HOSPITAL ENCOUNTER (OUTPATIENT)
Dept: MRI IMAGING | Facility: CLINIC | Age: 46
Discharge: HOME | End: 2021-09-15
Attending: FAMILY MEDICINE | Admitting: FAMILY MEDICINE
Payer: COMMERCIAL

## 2021-01-01 ENCOUNTER — COMMUNICATION - HEALTHEAST (OUTPATIENT)
Dept: SCHEDULING | Facility: CLINIC | Age: 46
End: 2021-01-01

## 2021-01-01 VITALS
OXYGEN SATURATION: 96 % | SYSTOLIC BLOOD PRESSURE: 132 MMHG | DIASTOLIC BLOOD PRESSURE: 88 MMHG | TEMPERATURE: 98.2 F | HEART RATE: 109 BPM

## 2021-01-01 VITALS
RESPIRATION RATE: 18 BRPM | SYSTOLIC BLOOD PRESSURE: 140 MMHG | HEART RATE: 102 BPM | OXYGEN SATURATION: 97 % | TEMPERATURE: 97.9 F | DIASTOLIC BLOOD PRESSURE: 88 MMHG

## 2021-01-01 VITALS
SYSTOLIC BLOOD PRESSURE: 124 MMHG | OXYGEN SATURATION: 96 % | HEART RATE: 110 BPM | HEIGHT: 67 IN | BODY MASS INDEX: 32.49 KG/M2 | WEIGHT: 207 LBS | DIASTOLIC BLOOD PRESSURE: 84 MMHG

## 2021-01-01 VITALS
HEART RATE: 112 BPM | OXYGEN SATURATION: 97 % | RESPIRATION RATE: 20 BRPM | DIASTOLIC BLOOD PRESSURE: 90 MMHG | TEMPERATURE: 97.6 F | SYSTOLIC BLOOD PRESSURE: 126 MMHG

## 2021-01-01 VITALS
RESPIRATION RATE: 18 BRPM | TEMPERATURE: 97.9 F | DIASTOLIC BLOOD PRESSURE: 92 MMHG | HEART RATE: 129 BPM | SYSTOLIC BLOOD PRESSURE: 138 MMHG | OXYGEN SATURATION: 97 %

## 2021-01-01 VITALS
SYSTOLIC BLOOD PRESSURE: 164 MMHG | TEMPERATURE: 97.9 F | OXYGEN SATURATION: 98 % | DIASTOLIC BLOOD PRESSURE: 100 MMHG | HEART RATE: 95 BPM

## 2021-01-01 VITALS
WEIGHT: 269.31 LBS | HEART RATE: 98 BPM | RESPIRATION RATE: 19 BRPM | HEIGHT: 70 IN | DIASTOLIC BLOOD PRESSURE: 82 MMHG | SYSTOLIC BLOOD PRESSURE: 126 MMHG | BODY MASS INDEX: 38.56 KG/M2 | OXYGEN SATURATION: 95 %

## 2021-01-01 VITALS
DIASTOLIC BLOOD PRESSURE: 84 MMHG | RESPIRATION RATE: 16 BRPM | BODY MASS INDEX: 41.4 KG/M2 | SYSTOLIC BLOOD PRESSURE: 148 MMHG | WEIGHT: 266.31 LBS | OXYGEN SATURATION: 97 % | HEART RATE: 83 BPM

## 2021-01-01 VITALS
RESPIRATION RATE: 18 BRPM | TEMPERATURE: 98 F | DIASTOLIC BLOOD PRESSURE: 68 MMHG | HEART RATE: 116 BPM | OXYGEN SATURATION: 97 % | SYSTOLIC BLOOD PRESSURE: 120 MMHG

## 2021-01-01 VITALS
DIASTOLIC BLOOD PRESSURE: 80 MMHG | HEART RATE: 104 BPM | OXYGEN SATURATION: 97 % | RESPIRATION RATE: 20 BRPM | TEMPERATURE: 98.2 F | SYSTOLIC BLOOD PRESSURE: 128 MMHG

## 2021-01-01 VITALS
OXYGEN SATURATION: 98 % | TEMPERATURE: 98.2 F | SYSTOLIC BLOOD PRESSURE: 132 MMHG | DIASTOLIC BLOOD PRESSURE: 86 MMHG | HEART RATE: 129 BPM

## 2021-01-01 VITALS
RESPIRATION RATE: 16 BRPM | HEART RATE: 111 BPM | TEMPERATURE: 96.4 F | SYSTOLIC BLOOD PRESSURE: 118 MMHG | OXYGEN SATURATION: 96 % | DIASTOLIC BLOOD PRESSURE: 62 MMHG

## 2021-01-01 VITALS
SYSTOLIC BLOOD PRESSURE: 138 MMHG | TEMPERATURE: 98 F | HEART RATE: 88 BPM | OXYGEN SATURATION: 97 % | RESPIRATION RATE: 18 BRPM | DIASTOLIC BLOOD PRESSURE: 88 MMHG

## 2021-01-01 VITALS
DIASTOLIC BLOOD PRESSURE: 80 MMHG | SYSTOLIC BLOOD PRESSURE: 128 MMHG | TEMPERATURE: 97.9 F | HEART RATE: 114 BPM | OXYGEN SATURATION: 97 % | RESPIRATION RATE: 20 BRPM

## 2021-01-01 VITALS
SYSTOLIC BLOOD PRESSURE: 136 MMHG | TEMPERATURE: 97 F | RESPIRATION RATE: 16 BRPM | HEART RATE: 96 BPM | OXYGEN SATURATION: 98 % | DIASTOLIC BLOOD PRESSURE: 84 MMHG

## 2021-01-01 VITALS
SYSTOLIC BLOOD PRESSURE: 150 MMHG | TEMPERATURE: 98.1 F | HEART RATE: 118 BPM | DIASTOLIC BLOOD PRESSURE: 98 MMHG | OXYGEN SATURATION: 98 %

## 2021-01-01 VITALS
DIASTOLIC BLOOD PRESSURE: 80 MMHG | SYSTOLIC BLOOD PRESSURE: 128 MMHG | HEART RATE: 102 BPM | TEMPERATURE: 97.9 F | RESPIRATION RATE: 18 BRPM | OXYGEN SATURATION: 97 %

## 2021-01-01 VITALS
HEART RATE: 105 BPM | WEIGHT: 260 LBS | BODY MASS INDEX: 40.81 KG/M2 | SYSTOLIC BLOOD PRESSURE: 144 MMHG | OXYGEN SATURATION: 95 % | HEIGHT: 67 IN | DIASTOLIC BLOOD PRESSURE: 82 MMHG

## 2021-01-01 VITALS
SYSTOLIC BLOOD PRESSURE: 138 MMHG | DIASTOLIC BLOOD PRESSURE: 72 MMHG | HEART RATE: 120 BPM | RESPIRATION RATE: 16 BRPM | OXYGEN SATURATION: 96 % | TEMPERATURE: 96.1 F

## 2021-01-01 VITALS
TEMPERATURE: 97.9 F | HEART RATE: 98 BPM | DIASTOLIC BLOOD PRESSURE: 80 MMHG | RESPIRATION RATE: 18 BRPM | SYSTOLIC BLOOD PRESSURE: 128 MMHG | OXYGEN SATURATION: 97 %

## 2021-01-01 VITALS
DIASTOLIC BLOOD PRESSURE: 98 MMHG | TEMPERATURE: 97.7 F | SYSTOLIC BLOOD PRESSURE: 142 MMHG | OXYGEN SATURATION: 97 % | HEART RATE: 88 BPM | RESPIRATION RATE: 18 BRPM

## 2021-01-01 VITALS
OXYGEN SATURATION: 97 % | HEART RATE: 112 BPM | DIASTOLIC BLOOD PRESSURE: 80 MMHG | TEMPERATURE: 98 F | SYSTOLIC BLOOD PRESSURE: 120 MMHG | RESPIRATION RATE: 18 BRPM

## 2021-01-01 VITALS
TEMPERATURE: 98 F | OXYGEN SATURATION: 97 % | RESPIRATION RATE: 18 BRPM | SYSTOLIC BLOOD PRESSURE: 138 MMHG | HEART RATE: 104 BPM | DIASTOLIC BLOOD PRESSURE: 90 MMHG

## 2021-01-01 VITALS
TEMPERATURE: 97.9 F | OXYGEN SATURATION: 98 % | RESPIRATION RATE: 20 BRPM | SYSTOLIC BLOOD PRESSURE: 130 MMHG | DIASTOLIC BLOOD PRESSURE: 80 MMHG | HEART RATE: 111 BPM

## 2021-01-01 VITALS
RESPIRATION RATE: 18 BRPM | SYSTOLIC BLOOD PRESSURE: 130 MMHG | HEART RATE: 118 BPM | DIASTOLIC BLOOD PRESSURE: 88 MMHG | OXYGEN SATURATION: 98 % | TEMPERATURE: 97.9 F

## 2021-01-01 DIAGNOSIS — E11.9 TYPE 2 DIABETES MELLITUS WITHOUT COMPLICATION, WITHOUT LONG-TERM CURRENT USE OF INSULIN (H): ICD-10-CM

## 2021-01-01 DIAGNOSIS — I10 BENIGN ESSENTIAL HYPERTENSION: ICD-10-CM

## 2021-01-01 DIAGNOSIS — E11.9 TYPE 2 DIABETES MELLITUS WITHOUT COMPLICATION, UNSPECIFIED WHETHER LONG TERM INSULIN USE (H): ICD-10-CM

## 2021-01-01 DIAGNOSIS — R35.0 URINARY FREQUENCY: ICD-10-CM

## 2021-01-01 DIAGNOSIS — Z86.711 HISTORY OF PULMONARY EMBOLISM: ICD-10-CM

## 2021-01-01 DIAGNOSIS — M84.361A STRESS FRACTURE OF RIGHT TIBIA, INITIAL ENCOUNTER: Primary | ICD-10-CM

## 2021-01-01 DIAGNOSIS — M25.361 KNEE INSTABILITY, RIGHT: ICD-10-CM

## 2021-01-01 DIAGNOSIS — R00.0 TACHYCARDIA: ICD-10-CM

## 2021-01-01 DIAGNOSIS — M79.661 PAIN OF RIGHT LOWER LEG: ICD-10-CM

## 2021-01-01 DIAGNOSIS — E11.65 TYPE 2 DIABETES MELLITUS WITH HYPERGLYCEMIA, WITH LONG-TERM CURRENT USE OF INSULIN (H): Primary | ICD-10-CM

## 2021-01-01 DIAGNOSIS — M25.561 RIGHT KNEE PAIN, UNSPECIFIED CHRONICITY: Primary | ICD-10-CM

## 2021-01-01 DIAGNOSIS — E11.9 TYPE 2 DIABETES MELLITUS WITHOUT COMPLICATION, WITHOUT LONG-TERM CURRENT USE OF INSULIN (H): Primary | ICD-10-CM

## 2021-01-01 DIAGNOSIS — M25.561 RIGHT KNEE PAIN, UNSPECIFIED CHRONICITY: ICD-10-CM

## 2021-01-01 DIAGNOSIS — Z79.4 TYPE 2 DIABETES MELLITUS WITH HYPERGLYCEMIA, WITH LONG-TERM CURRENT USE OF INSULIN (H): Primary | ICD-10-CM

## 2021-01-01 DIAGNOSIS — D72.829 LEUKOCYTOSIS, UNSPECIFIED TYPE: ICD-10-CM

## 2021-01-01 DIAGNOSIS — R60.0 LOWER EXTREMITY EDEMA: ICD-10-CM

## 2021-01-01 DIAGNOSIS — Z79.4 TYPE 2 DIABETES MELLITUS WITH HYPERGLYCEMIA, WITH LONG-TERM CURRENT USE OF INSULIN (H): ICD-10-CM

## 2021-01-01 DIAGNOSIS — E11.65 TYPE 2 DIABETES MELLITUS WITH HYPERGLYCEMIA, WITH LONG-TERM CURRENT USE OF INSULIN (H): ICD-10-CM

## 2021-01-01 DIAGNOSIS — T07.XXXA MULTIPLE TRAUMATIC INJURIES: ICD-10-CM

## 2021-01-01 DIAGNOSIS — R22.43 LOCALIZED SWELLING OF BOTH LOWER LEGS: ICD-10-CM

## 2021-01-01 LAB
ALBUMIN SERPL-MCNC: 3.1 G/DL (ref 3.5–5)
ALBUMIN SERPL-MCNC: 3.2 G/DL (ref 3.5–5)
ALBUMIN UR-MCNC: NEGATIVE MG/DL
ALP SERPL-CCNC: 102 U/L (ref 45–120)
ALP SERPL-CCNC: 87 U/L (ref 45–120)
ALT SERPL W P-5'-P-CCNC: 48 U/L (ref 0–45)
ALT SERPL W P-5'-P-CCNC: 48 U/L (ref 0–45)
ALT SERPL-CCNC: 56 IU/L (ref 8–45)
ANION GAP SERPL CALCULATED.3IONS-SCNC: 11 MMOL/L (ref 5–18)
ANION GAP SERPL CALCULATED.3IONS-SCNC: 12 MMOL/L (ref 5–18)
ANION GAP SERPL CALCULATED.3IONS-SCNC: 15 MMOL/L (ref 5–18)
APPEARANCE UR: CLEAR
AST SERPL W P-5'-P-CCNC: 27 U/L (ref 0–40)
AST SERPL W P-5'-P-CCNC: 28 U/L (ref 0–40)
AST SERPL-CCNC: 83 IU/L (ref 2–40)
BASOPHILS # BLD AUTO: 0.1 THOU/UL (ref 0–0.2)
BASOPHILS NFR BLD AUTO: 1 % (ref 0–2)
BILIRUB SERPL-MCNC: 0.3 MG/DL (ref 0–1)
BILIRUB SERPL-MCNC: 0.5 MG/DL (ref 0–1)
BILIRUB UR QL STRIP: NEGATIVE
BUN SERPL-MCNC: 10 MG/DL (ref 8–22)
BUN SERPL-MCNC: 15 MG/DL (ref 8–22)
BUN SERPL-MCNC: 24 MG/DL (ref 8–22)
C PEPTIDE SERPL-MCNC: 1.5 NG/ML (ref 0.9–6.9)
CALCIUM SERPL-MCNC: 9 MG/DL (ref 8.5–10.5)
CALCIUM SERPL-MCNC: 9.1 MG/DL (ref 8.5–10.5)
CALCIUM SERPL-MCNC: 9.3 MG/DL (ref 8.5–10.5)
CHLORIDE BLD-SCNC: 102 MMOL/L (ref 98–107)
CHLORIDE BLD-SCNC: 104 MMOL/L (ref 98–107)
CHLORIDE BLD-SCNC: 96 MMOL/L (ref 98–107)
CO2 SERPL-SCNC: 21 MMOL/L (ref 22–31)
CO2 SERPL-SCNC: 24 MMOL/L (ref 22–31)
CO2 SERPL-SCNC: 25 MMOL/L (ref 22–31)
COLOR UR AUTO: YELLOW
CREAT SERPL-MCNC: 0.78 MG/DL (ref 0.7–1.3)
CREAT SERPL-MCNC: 0.86 MG/DL (ref 0.7–1.3)
CREAT SERPL-MCNC: 1.02 MG/DL (ref 0.7–1.3)
CREAT UR-MCNC: 206 MG/DL
CREATININE (EXTERNAL): 1.52 MG/DL (ref 0.72–1.25)
D DIMER PPP FEU-MCNC: 0.29 UG/ML FEU (ref 0–0.5)
EOSINOPHIL # BLD AUTO: 0 THOU/UL (ref 0–0.4)
EOSINOPHIL NFR BLD AUTO: 0 % (ref 0–6)
ERYTHROCYTE [DISTWIDTH] IN BLOOD BY AUTOMATED COUNT: 15.6 % (ref 11–14.5)
GFR ESTIMATED (EXTERNAL): 49 ML/MIN/1.73M2
GFR ESTIMATED (IF AFRICAN AMERICAN) (EXTERNAL): 60 ML/MIN/1.73M2
GFR SERPL CREATININE-BSD FRML MDRD: >60 ML/MIN/1.73M2
GFR SERPL CREATININE-BSD FRML MDRD: >90 ML/MIN/1.73M2
GFR SERPL CREATININE-BSD FRML MDRD: >90 ML/MIN/1.73M2
GLUCOSE (EXTERNAL): 401 MG/DL (ref 65–100)
GLUCOSE BLD-MCNC: 150 MG/DL (ref 70–125)
GLUCOSE BLD-MCNC: 262 MG/DL (ref 70–125)
GLUCOSE BLD-MCNC: 650 MG/DL (ref 70–125)
GLUCOSE UR STRIP-MCNC: ABNORMAL MG/DL
HBA1C MFR BLD: 12.4 % (ref 0–5.6)
HBA1C MFR BLD: 12.5 % (ref 0–5.6)
HBA1C MFR BLD: 13.4 %
HCT VFR BLD AUTO: 45.3 % (ref 40–54)
HGB BLD-MCNC: 14.6 G/DL (ref 14–18)
HGB UR QL STRIP: NEGATIVE
IMM GRANULOCYTES # BLD: 0.4 THOU/UL
IMM GRANULOCYTES NFR BLD: 5 %
INR (EXTERNAL): 1
KETONES UR STRIP-MCNC: ABNORMAL MG/DL
LEUKOCYTE ESTERASE UR QL STRIP: NEGATIVE
LYMPHOCYTES # BLD AUTO: 0.4 THOU/UL (ref 0.8–4.4)
LYMPHOCYTES NFR BLD AUTO: 5 % (ref 20–40)
MCH RBC QN AUTO: 26.6 PG (ref 27–34)
MCHC RBC AUTO-ENTMCNC: 32.2 G/DL (ref 32–36)
MCV RBC AUTO: 83 FL (ref 80–100)
MICROALBUMIN UR-MCNC: 8.56 MG/DL (ref 0–1.99)
MICROALBUMIN/CREAT UR: 41.6 MG/G CR
MONOCYTES # BLD AUTO: 0.2 THOU/UL (ref 0–0.9)
MONOCYTES NFR BLD AUTO: 3 % (ref 2–10)
NEUTROPHILS # BLD AUTO: 6.7 THOU/UL (ref 2–7.7)
NEUTROPHILS NFR BLD AUTO: 87 % (ref 50–70)
NITRATE UR QL: NEGATIVE
PH UR STRIP: 5.5 [PH] (ref 5–8)
PLATELET # BLD AUTO: 238 THOU/UL (ref 140–440)
PMV BLD AUTO: 12 FL (ref 8.5–12.5)
POTASSIUM (EXTERNAL): 4.4 MMOL/L (ref 3.5–5)
POTASSIUM BLD-SCNC: 3.8 MMOL/L (ref 3.5–5)
POTASSIUM BLD-SCNC: 3.9 MMOL/L (ref 3.5–5)
POTASSIUM BLD-SCNC: 4.8 MMOL/L (ref 3.5–5)
PROT SERPL-MCNC: 5.9 G/DL (ref 6–8)
PROT SERPL-MCNC: 6.4 G/DL (ref 6–8)
RBC # BLD AUTO: 5.49 MILL/UL (ref 4.4–6.2)
SODIUM SERPL-SCNC: 132 MMOL/L (ref 136–145)
SODIUM SERPL-SCNC: 139 MMOL/L (ref 136–145)
SODIUM SERPL-SCNC: 139 MMOL/L (ref 136–145)
SP GR UR STRIP: 1.01 (ref 1–1.03)
UROBILINOGEN UR STRIP-ACNC: ABNORMAL
WBC: 7.7 THOU/UL (ref 4–11)

## 2021-01-01 PROCEDURE — 85379 FIBRIN DEGRADATION QUANT: CPT | Performed by: FAMILY MEDICINE

## 2021-01-01 PROCEDURE — 36415 COLL VENOUS BLD VENIPUNCTURE: CPT

## 2021-01-01 PROCEDURE — 99215 OFFICE O/P EST HI 40 MIN: CPT | Performed by: FAMILY MEDICINE

## 2021-01-01 PROCEDURE — 80053 COMPREHEN METABOLIC PANEL: CPT

## 2021-01-01 PROCEDURE — 36415 COLL VENOUS BLD VENIPUNCTURE: CPT | Performed by: FAMILY MEDICINE

## 2021-01-01 PROCEDURE — 80053 COMPREHEN METABOLIC PANEL: CPT | Performed by: FAMILY MEDICINE

## 2021-01-01 PROCEDURE — 83036 HEMOGLOBIN GLYCOSYLATED A1C: CPT | Performed by: FAMILY MEDICINE

## 2021-01-01 PROCEDURE — 73560 X-RAY EXAM OF KNEE 1 OR 2: CPT | Mod: TC | Performed by: RADIOLOGY

## 2021-01-01 PROCEDURE — 73721 MRI JNT OF LWR EXTRE W/O DYE: CPT | Mod: RT

## 2021-01-01 PROCEDURE — 82043 UR ALBUMIN QUANTITATIVE: CPT

## 2021-01-01 PROCEDURE — 83036 HEMOGLOBIN GLYCOSYLATED A1C: CPT

## 2021-01-01 RX ORDER — NAPROXEN 500 MG/1
500 TABLET ORAL 2 TIMES DAILY WITH MEALS
Qty: 30 TABLET | Refills: 1 | Status: SHIPPED | OUTPATIENT
Start: 2021-01-01 | End: 2021-01-01

## 2021-01-01 RX ORDER — METOPROLOL SUCCINATE 25 MG/1
TABLET, EXTENDED RELEASE ORAL
Qty: 90 TABLET | Refills: 3 | Status: SHIPPED | OUTPATIENT
Start: 2021-01-01

## 2021-01-01 RX ORDER — GABAPENTIN 300 MG/1
CAPSULE ORAL
Qty: 30 CAPSULE | Refills: 0 | Status: SHIPPED | OUTPATIENT
Start: 2021-01-01

## 2021-01-01 RX ORDER — FERROUS SULFATE 325(65) MG
325 TABLET ORAL 2 TIMES DAILY
COMMUNITY
Start: 2021-01-01

## 2021-01-01 RX ORDER — FLUTICASONE PROPIONATE 50 MCG
2 SPRAY, SUSPENSION (ML) NASAL DAILY
COMMUNITY
Start: 2020-01-01

## 2021-01-01 RX ORDER — TRAMADOL HYDROCHLORIDE 50 MG/1
50 TABLET ORAL 2 TIMES DAILY PRN
Qty: 14 TABLET | Refills: 0 | OUTPATIENT
Start: 2021-01-01

## 2021-01-01 RX ORDER — ASPIRIN 81 MG/1
81 TABLET, CHEWABLE ORAL DAILY
COMMUNITY
Start: 2021-01-01

## 2021-01-01 RX ORDER — GABAPENTIN 300 MG/1
300 CAPSULE ORAL AT BEDTIME
Qty: 30 CAPSULE | Refills: 0 | Status: SHIPPED | OUTPATIENT
Start: 2021-01-01 | End: 2021-01-01

## 2021-01-01 RX ORDER — FUROSEMIDE 20 MG
1 TABLET ORAL DAILY PRN
COMMUNITY
Start: 2021-01-01

## 2021-01-01 RX ORDER — ACETAMINOPHEN 500 MG
1000 TABLET ORAL 2 TIMES DAILY
COMMUNITY
Start: 2021-01-01

## 2021-01-01 RX ORDER — TRAMADOL HYDROCHLORIDE 50 MG/1
50 TABLET ORAL 2 TIMES DAILY PRN
Qty: 14 TABLET | Refills: 0 | Status: SHIPPED | OUTPATIENT
Start: 2021-01-01 | End: 2021-01-01

## 2021-01-01 RX ORDER — PEN NEEDLE, DIABETIC 32GX 5/32"
NEEDLE, DISPOSABLE MISCELLANEOUS
COMMUNITY
Start: 2021-01-01

## 2021-01-01 RX ORDER — INSULIN GLARGINE 100 [IU]/ML
INJECTION, SOLUTION SUBCUTANEOUS
Qty: 30 ML | Refills: 0 | Status: SHIPPED | OUTPATIENT
Start: 2021-01-01

## 2021-01-01 RX ORDER — TRAMADOL HYDROCHLORIDE 50 MG/1
TABLET ORAL
Qty: 14 TABLET | Refills: 0 | OUTPATIENT
Start: 2021-01-01

## 2021-01-01 RX ORDER — MULTIPLE VITAMINS W/ MINERALS TAB 9MG-400MCG
1 TAB ORAL DAILY
Refills: 0 | COMMUNITY
Start: 2021-01-01

## 2021-01-01 RX ORDER — NAPROXEN 500 MG/1
TABLET ORAL
Qty: 30 TABLET | Refills: 0 | Status: SHIPPED | OUTPATIENT
Start: 2021-01-01

## 2021-01-01 RX ORDER — METOPROLOL SUCCINATE 25 MG/1
TABLET, EXTENDED RELEASE ORAL
Qty: 30 TABLET | Refills: 0 | Status: SHIPPED | OUTPATIENT
Start: 2021-01-01 | End: 2021-01-01

## 2021-01-01 ASSESSMENT — MIFFLIN-ST. JEOR
SCORE: 2016.94
SCORE: 2089.9
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
## 2021-06-11 NOTE — TELEPHONE ENCOUNTER
Request for Orders    Who s Requesting: Home Care Physical Therapist    Orders being requested:  PT 2x/wk x 3 weeks for strengthening, gait and balance    Where to send Orders: Please reply through Epic    Thank you  Yoselin

## 2021-06-11 NOTE — PATIENT INSTRUCTIONS - HE
I sent that water pill to the pharmacy to use as needed for the leg swelling issues.    PT will be sending me messages about getting your therapy orders taken care of.    It sounds like you have your specialists lined up for management of the surgical issues, but if you need me to connect you with a specialist, let me know.
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
## 2021-06-12 NOTE — TELEPHONE ENCOUNTER
Please call medical equipment company.  Exactly what it is they are looking for.  Maybe they just need my office notes where I mention his colostomy?    Nicholas Santos, CNP

## 2021-06-12 NOTE — TELEPHONE ENCOUNTER
Refill sent to the pharmacy.  At the end of this fill he should have had 3 months of anticoagulation which according to the discharge notes should be long enough to stop.    Nicholas Santos, CNP

## 2021-06-12 NOTE — TELEPHONE ENCOUNTER
Partner of patient is calling. She believes Nicholas Santos is patient's primary.  The order for Eliquis is historical.  The chart reflect Dr Morgan is primary.  Please review.    Medication Request  Medication name:   apixaban ANTICOAGULANT (ELIQUIS) 5 mg Tab tablet  5 mg, Oral     Requested Pharmacy: [facility]  Reason for request: Partner states of medication tomorrow.    When did you use medication last?:  today  Patient offered appointment:  no  Okay to leave a detailed message: yes

## 2021-06-12 NOTE — TELEPHONE ENCOUNTER
Nicholas Santos,  Wanted to report high HR today at 128.  He did have high HR in hospital but it has been trending lower at home in the 100-110 range and last visit was 95.  He was feeling more fatigued today but otherwise all other vitals good.

## 2021-06-12 NOTE — TELEPHONE ENCOUNTER
GARETH Santos or covering provider:  Was out seeing Dejan today his rest HR was 116, he has had elevated HR with a few of his visits, regular rhythm. He does not feel like his heart is racing or any chest pains. Please advise  Thanks,  Veena MULLEN RN

## 2021-06-12 NOTE — TELEPHONE ENCOUNTER
Nicholas Santos or jannette REIS:    Request to continue skilled nursing 2x per week for 4 weeks for wound care and ostomy education.  Thanks,  Veena MULLEN RN

## 2021-06-12 NOTE — PROGRESS NOTES
CARL WILL NOT NEED VISIT ON THURSDAY 10/15 HE WILL BE SEEING [facility] NURSE IN CLINIC AND WILL HAVE OSTOMY CHANGED THEN.

## 2021-06-12 NOTE — TELEPHONE ENCOUNTER
Nicholas Santos or covering provider:    Request to continue home care skilled nursing for 3 more weeks 2x per week for wound care.  Thanks,  Veena MULLEN RN

## 2021-06-12 NOTE — TELEPHONE ENCOUNTER
[facility] [employer] called last week. I let them know Nicholas's message from below. They are not sure about what wound either. They stated form could be disregarded. [facility]ax [facility].     Margaret Monique [facility]

## 2021-06-12 NOTE — TELEPHONE ENCOUNTER
As long as it is regular, and there is no other symptoms like fever, chills, etc. then continue to monitor.    Nicholas Santos CNP

## 2021-06-13 NOTE — TELEPHONE ENCOUNTER
Please call the patient.  I have future lab orders that I was hoping he could get done here ASAP.  Please help schedule lab only appt.    Nicholas Santos, CNP

## 2021-06-13 NOTE — PROGRESS NOTES
Your patient was discharged from [facility] on 12.11.2020 because GOALS MET  Thank you for allowing us to provide care to this patient!  A Discharge summary is available upon request .[phone]
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
## 2021-06-13 NOTE — TELEPHONE ENCOUNTER
Nicholas Santos:    Patient has had continued elevated /98 at home care RN visit today, asymptomatic. He HR was actually low for him today 88, but states at therapy yesterday was 127. He is planning to make apt with you in clinic to discuss above. He also would like to have referral for wound nurse at [facility] in [place] since he is already going to therapy there.   Thanks,  Veena MULLEN RN

## 2021-06-13 NOTE — TELEPHONE ENCOUNTER
Left message to call back for: Pt.   Information to relay to patient:  Left detailed message on identifiable voicemail with information below.     Faxed and placed in to be scanned.     Copy was made? n/a.    Margaret Monique, CMA

## 2021-06-13 NOTE — TELEPHONE ENCOUNTER
Nicholas Santos or covering provider:    Request to resume home care 2x per week for 4 weeks for wound care and to do a last re-cerification visit, his cert ended on 11/22 and requesting to do on 11/24/2020, thanksVeena RN

## 2021-06-13 NOTE — TELEPHONE ENCOUNTER
Left message to call back for: Veena  Information to relay to patient:  Left detailed msg regarding hao ok for requested orders

## 2021-06-13 NOTE — TELEPHONE ENCOUNTER
Nicholas Santos or covering provider:    Seen Dejan today for wound care, his abdominal wound has not been making much progress can he have a referal to a wound clinic?     Also his BP today was elevated 138/92 and HR remains resting elevated 129, which his HR has been elevated for quite some time just resting. He states he does not have any chest pain, dizziness, just feels tired a lot. States he also made some lifestyle changes and cut way back on caffeine. Please advice.  Thanks,  Veena MULLEN RN

## 2021-06-13 NOTE — PROGRESS NOTES
HOME HEALTH MISSED VISIT NOTIFICATION (FYI)       Your patient who receives home health services missed a visit on:  11/27/2020      Type of service missed: SNV       Reason for missed visit (pick applicable reason): declined visit, states family will assist today with wound care      Provider(s) to be notified: Nicholas Santos, CNP;

## 2021-06-13 NOTE — TELEPHONE ENCOUNTER
Wound clinic referral placed.  If heart rate continues to be elevated, OV for evaluation.    Nicholas Santos, CNP

## 2021-06-14 NOTE — TELEPHONE ENCOUNTER
Reason for call:  Patient reporting a symptom    Symptom or request: pt is having tight legs again- troubles with knees, he looks swollen. When you touch his knees, feels like a build-up of water in his knees. Would like a rx of water pills for this.  Uses [facility]     Duration (how long have symptoms been present): 1 week    Have you been treated for this before? Yes    Additional comments:     Phone Number patient can be reached at:   Best Time:  min at [phone]    Can we leave a detailed message on this number: Yes    Call taken on 1/15/2021 at 12:28 PM by Ashley Hayden

## 2021-06-14 NOTE — TELEPHONE ENCOUNTER
Refill sent to the pharmacy.  Follow-up if not getting better like before.    Nicholas Santos, CNP

## 2021-06-14 NOTE — TELEPHONE ENCOUNTER
Lasix on current med list.     Last Med Check: 9/25/20.    Next med check due on: 6/2021.    Future Appointment Scheduled ? No.     Last Med Refill? 9/25/20.    Margaret Monique, CMA

## 2021-06-15 NOTE — PATIENT INSTRUCTIONS - HE
Updating labs today.    I'm going to give you a call later this afternoon/evening so that we can go over labs and come up with a plan.  Possibly may need insulin.    Drink WATER!    We'll get you connected with diabetic education.
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
## 2021-06-15 NOTE — TELEPHONE ENCOUNTER
Called patient to discuss glucose control after increasing lantus to 25 units. Message left. Will try again next week.    Nicholas Santos, CNP

## 2021-06-15 NOTE — TELEPHONE ENCOUNTER
Paperwork faxed to Linda Dick in [place] at [phone]. Paperwork shredded since nothing further was done with it.     Margaret Monique, CMA

## 2021-06-15 NOTE — TELEPHONE ENCOUNTER
Critical lab value blood sugar done at 1649 was 650.  Writer paged Dr. Breaux at 9:52 pm.  ZACHARY Santos started patient on Insulin prior to leaving today.   Rula Humphrey RN, [facility] Triage Nurse Advisor

## 2021-06-15 NOTE — PROGRESS NOTES
Chief Complaint   Patient presents with     Urinary Frequency     Symptoms started a couple weeks ago. Thought that it would go away on its now but it has not.      Urinary Incontinence     Not able to hold it in.      Erectile Dysfunction     States that he is not able to get an erection.        HPI: Dejan Massey is a 45 year old male with a history significant for DM who presents today with concerns of urinary frequency that has been problematic for the last 2 weeks. He states that he is going almost every hour and has good urine output. He is unable to hold his urine and will have some incontinence if he cant make it to the bathroom in time. Dejan states that he has been drinking more fluids than normal to compensate increased thirst.(drinking gallons of milk) Related symptoms are erectile dysfunction, which started the same time as the urinary frequency. Denies fever chills, dysuria and hematuria. The patient admits to stopping his metformin to see if that was the cause of his frequency.      In discussion with the patient and his significant other, it sounds like diet has been more indiscriminate and over the last month.  Significant increase in carbs/sugar intake.    ROS:Review of Systems - negative except for what's listed in the HPI    SH: The Patient's  reports that he has quit smoking. He has never used smokeless tobacco.      FH: The Patient's family history is not on file.     Meds:    Current Outpatient Medications on File Prior to Visit   Medication Sig Dispense Refill     FA/mv,Ca,iron,min/lycopene/lut (MULTIVITAL ORAL) Take 1 tablet by mouth.       furosemide (LASIX) 20 MG tablet Take 1 tablet (20 mg total) by mouth daily as needed. 30 tablet 1     loratadine (CLARITIN) 10 mg tablet Take 10 mg by mouth.       melatonin-pyridoxine HCl, B6, 3-10 mg Tab Take 9 mg by mouth.       metFORMIN (GLUCOPHAGE) 500 MG tablet 1 tab in AM x1 week. Then 1AM 1PM x1week. Then 2AM 1PM x1week. Then 2AM 2PM (Patient  "taking differently: Currently taking 1 tab two times a day.) 360 tablet 0     [DISCONTINUED] metoprolol succinate (TOPROL XL) 25 MG Take 1 tablet (25 mg total) by mouth daily. 60 tablet 0     apixaban ANTICOAGULANT (ELIQUIS) 5 mg Tab tablet Take 1 tablet (5 mg total) by mouth 2 (two) times a day. 60 tablet 0     fluticasone propionate (FLONASE ALLERGY RELIEF) 50 mcg/actuation nasal spray 2 sprays into each nostril daily. 16 g 12     mupirocin (BACTROBAN) 2 % ointment Apply topically.       sodium chloride 0.65 % Drop 1 spray into each nostril.       No current facility-administered medications on file prior to visit.        O:  /82   Pulse (!) 105   Ht 5' 7.25\" (1.708 m)   Wt (!) 260 lb (117.9 kg)   SpO2 95%   BMI 40.42 kg/m      Physical Examination:   General appearance - alert, well appearing, and in no distress  Mental status - alert, oriented to person, place, and time  Lymphatics - no palpable lymphadenopathy  Chest - clear to auscultation, no wheezes, rales or rhonchi, symmetric air entry  Heart - normal rate and regular rhythm, S1 and S2 normal, no murmurs noted  Abdomen - soft, nontender, nondistended, no masses or organomegaly  : negative for CVA tenderness  Neurological - alert, oriented, normal speech, no focal findings or movement disorder noted  Musculoskeletal - no joint tenderness, deformity or swelling  Extremities - peripheral pulses normal, no peripheral edema  Skin - multiple scars, healing well. Drainage bag present      A/P:     Problem List Items Addressed This Visit        Edg Concept Cardiac Problems    Benign essential hypertension    Relevant Medications    metoprolol succinate (TOPROL XL) 25 MG    Diabetes mellitus, type 2 (H)    Relevant Orders    Basic Metabolic Panel    Glycosylated Hemoglobin A1c    Glucose    C-Peptide      Other Visit Diagnoses     Urinary frequency    -  Primary    Relevant Orders    Urinalysis Macroscopic (Completed)    Tachycardia        Relevant " Medications    metoprolol succinate (TOPROL XL) 25 MG    Leukocytosis, unspecified type        Relevant Orders    HM1(CBC and Differential)            1. Urinary frequency  Positive for ketones and glucose  - Urinalysis Macroscopic  - Restart metformin  - Referral for diabetic education    2. Tachycardia  Refill sent to pharmacy   - metoprolol succinate (TOPROL XL) 25 MG; Take 1 tablet (25 mg total) by mouth daily.  Dispense: 90 tablet; Refill: 1    3. Benign essential hypertension  Refill sent to pharmacy  - metoprolol succinate (TOPROL XL) 25 MG; Take 1 tablet (25 mg total) by mouth daily.  Dispense: 90 tablet; Refill: 1    4. Type 2 diabetes mellitus without complication, unspecified whether long term insulin use (H)  Blood sugar reading in clinic was too high to register  - Basic Metabolic Panel  - Glycosylated Hemoglobin A1c  - Glucose  - C-Peptide    5. Leukocytosis, unspecified type  - HM1(CBC and Differential)    Agree with above documentation from nurse practitioner student.  Addendum was made by me in bold.  In addition, urinalysis showed 500 of sugars along with spilling ketones.  BMP shows normal anion gap.  No DKA right now, but we do need to get the sugars down quickly.  Told the patient that his polydipsia/polyuria was likely due to uncontrolled sugars in the setting of low activity and poor dietary intake.  Stopping the Metformin only exacerbated the problem leading to where we are now.  Restart Metformin at 1000 mg twice daily.  In addition to this, I discussed with the patient that he needs to start taking Lantus that same day.  Told him to ask the pharmacist to show him how to do this.  Also sent a glucometer to start checking sugars.  We will need to get him connected with diabetic education to review diet management.  Given the sudden rise in A1c, add on C-peptide as well to confirm that this is a type 2 diabetes.  I will be calling him on Monday to see how he is doing and we will adjust insulin  from there.    Total time spent was at least 40 minutes including reviewing records prior to arrival, consultation, placing orders, education, and reviewing the plan of care on the date of service.      Nicholas Santos, CNP      This note has been dictated using voice recognition software. Any grammatical or context distortions are unintentional and inherent to the software.

## 2021-06-16 NOTE — TELEPHONE ENCOUNTER
RN cannot approve Refill Request    RN can NOT refill this medication Protocol failed and NO refill given. Last office visit: 2/19/2021 Nicholas Santos CNP Last Physical: Visit date not found Last MTM visit: Visit date not found Last visit same specialty: 2/19/2021 Nicholas Santos CNP.  Next visit within 3 mo: Visit date not found  Next physical within 3 mo: Visit date not found      Tino Wise, [facility] Connection Triage/Med Refill 4/6/2021    Requested Prescriptions   Pending Prescriptions Disp Refills     LANTUS SOLOSTAR U-100 INSULIN 100 unit/mL (3 mL) pen [Pharmacy Med Name: Lantus SoloStar 100 UNIT/ML Subcutaneous Solution Pen-injector] 15 mL 0     Sig: INJECT 15 UNITS SUBCUTANEOUSLY ONCE DAILY       Insulin/GLP-1 Refill Protocol Failed - 4/5/2021  1:35 PM        Failed - Microalbumin in last year     No results found for: MICROALBUR               Passed - Visit with PCP or prescribing provider visit in last 6 months     Last office visit with prescriber/PCP: 2/19/2021 OR same dept: 2/19/2021 Nicholas Santos CNP OR same specialty: 2/19/2021 Nicholas Santos CNP Last physical: Visit date not found Last MTM visit: Visit date not found     Next appt within 3 mo: Visit date not found  Next physical within 3 mo: Visit date not found  Prescriber OR PCP: Nicholas Santos CNP  Last diagnosis associated with med order: 1. Lower extremity edema  - furosemide (LASIX) 20 MG tablet; TAKE 1 TABLET BY MOUTH ONCE DAILY AS NEEDED  Dispense: 90 tablet; Refill: 3    2. Type 2 diabetes mellitus without complication, unspecified whether long term insulin use (H)  - LANTUS SOLOSTAR U-100 INSULIN 100 unit/mL (3 mL) pen [Pharmacy Med Name: Lantus SoloStar 100 UNIT/ML Subcutaneous Solution Pen-injector]; INJECT 15 UNITS SUBCUTANEOUSLY ONCE DAILY  Dispense: 15 mL; Refill: 0    If protocol passes may refill for 6 months if within 3 months of last provider visit (or a total of 9 months).              Passed - A1C in last 6 months      Hemoglobin A1c   Date Value Ref Range Status   02/19/2021 13.4 (H) <=5.6 % Final               Passed - Blood pressure in last year     BP Readings from Last 1 Encounters:   02/19/21 144/82             Passed - Creatinine done in last year     Creatinine   Date Value Ref Range Status   02/19/2021 1.02 0.70 - 1.30 mg/dL Final              Signed Prescriptions Disp Refills    furosemide (LASIX) 20 MG tablet 90 tablet 3     Sig: TAKE 1 TABLET BY MOUTH ONCE DAILY AS NEEDED       Diuretics/Combination Diuretics Refill Protocol  Passed - 4/5/2021  1:35 PM        Passed - Visit with PCP or prescribing provider visit in past 12 months     Last office visit with prescriber/PCP: 2/19/2021 Nicholas Santos CNP OR same dept: 2/19/2021 Nicholas Santos CNP OR same specialty: 2/19/2021 Nicholas Santos CNP  Last physical: Visit date not found Last MTM visit: Visit date not found   Next visit within 3 mo: Visit date not found  Next physical within 3 mo: Visit date not found  Prescriber OR PCP: Nicholas Santos CNP  Last diagnosis associated with med order: 1. Lower extremity edema  - furosemide (LASIX) 20 MG tablet; TAKE 1 TABLET BY MOUTH ONCE DAILY AS NEEDED  Dispense: 90 tablet; Refill: 3    2. Type 2 diabetes mellitus without complication, unspecified whether long term insulin use (H)  - LANTUS SOLOSTAR U-100 INSULIN 100 unit/mL (3 mL) pen [Pharmacy Med Name: Lantus SoloStar 100 UNIT/ML Subcutaneous Solution Pen-injector]; INJECT 15 UNITS SUBCUTANEOUSLY ONCE DAILY  Dispense: 15 mL; Refill: 0    If protocol passes may refill for 12 months if within 3 months of last provider visit (or a total of 15 months).             Passed - Serum Potassium in past 12 months      Lab Results   Component Value Date    Potassium 4.8 02/19/2021             Passed - Serum Sodium in past 12 months      Lab Results   Component Value Date    Sodium 132 (L) 02/19/2021             Passed - Blood pressure on file in past 12 months     BP Readings from  Last 1 Encounters:   02/19/21 144/82             Passed - Serum Creatinine in past 12 months      Creatinine   Date Value Ref Range Status   02/19/2021 1.02 0.70 - 1.30 mg/dL Final

## 2021-06-16 NOTE — TELEPHONE ENCOUNTER
Last visit 2/19/21.     How many times per day is he taking his metformin?     May need f/u visit to discuss sugar levels and insulin.

## 2021-06-16 NOTE — TELEPHONE ENCOUNTER
Metformin sent off.  Thanks for the update.  Increase insulin by another 5 units.  Let me know in a week how morning sugars look.    Nicholas Santos, CNP

## 2021-06-16 NOTE — TELEPHONE ENCOUNTER
RN cannot approve Refill Request    RN can NOT refill this medication Protocol failed and NO refill given. Last office visit: 2/19/2021 Nicholas Santos CNP Last Physical: Visit date not found Last MTM visit: Visit date not found Last visit same specialty: 2/19/2021 Nicholas Santos CNP.  Next visit within 3 mo: Visit date not found  Next physical within 3 mo: Visit date not found      Tino Wise, [facility] Connection Triage/Med Refill 4/12/2021    Requested Prescriptions   Pending Prescriptions Disp Refills     metFORMIN (GLUCOPHAGE) 500 MG tablet       Sig: Take 2 tablets (1,000 mg total) by mouth 2 (two) times a day with meals. 1 tab in AM x1 week. Then 1AM 1PM x1week. Then 2AM 1PM x1week. Then 2AM 2PM       Metformin Refill Protocol Failed - 4/12/2021  2:16 PM        Failed - Microalbumin in last year      No results found for: MICROALBUR               Passed - Blood pressure in last 12 months     BP Readings from Last 1 Encounters:   02/19/21 144/82             Passed - LFT or AST or ALT in last 12 months     Albumin   Date Value Ref Range Status   12/09/2020 3.9 3.5 - 5.0 g/dL Final     Bilirubin, Total   Date Value Ref Range Status   12/09/2020 0.4 0.0 - 1.0 mg/dL Final     Alkaline Phosphatase   Date Value Ref Range Status   12/09/2020 81 45 - 120 U/L Final     AST   Date Value Ref Range Status   12/09/2020 46 (H) 0 - 40 U/L Final     ALT   Date Value Ref Range Status   12/09/2020 74 (H) 0 - 45 U/L Final     Protein, Total   Date Value Ref Range Status   12/09/2020 7.4 6.0 - 8.0 g/dL Final                Passed - GFR or Serum Creatinine in last 6 months     GFR MDRD Non Af Amer   Date Value Ref Range Status   02/19/2021 >60 >60 mL/min/1.73m2 Final     GFR MDRD Af Amer   Date Value Ref Range Status   02/19/2021 >60 >60 mL/min/1.73m2 Final             Passed - Visit with PCP or prescribing provider visit in last 6 months or next 3 months     Last office visit with prescriber/PCP: 2/19/2021 OR same dept: 2/19/2021  Koppe, Nicholas A, CNP OR same specialty: 2/19/2021 Nicholas Santos CNP Last physical: Visit date not found Last MTM visit: Visit date not found         Next appt within 3 mo: Visit date not found  Next physical within 3 mo: Visit date not found  Prescriber OR PCP: Nicholas Santos CNP  Last diagnosis associated with med order: 1. Type 2 diabetes mellitus without complication, without long-term current use of insulin (H)  - metFORMIN (GLUCOPHAGE) 500 MG tablet; Take 2 tablets (1,000 mg total) by mouth 2 (two) times a day with meals. 1 tab in AM x1 week. Then 1AM 1PM x1week. Then 2AM 1PM x1week. Then 2AM 2PM     If protocol passes may refill for 12 months if within 3 months of last provider visit (or a total of 15 months).           Passed - A1C in last 6 months     Hemoglobin A1c   Date Value Ref Range Status   02/19/2021 13.4 (H) <=5.6 % Final

## 2021-06-16 NOTE — TELEPHONE ENCOUNTER
Refill Approved    Rx renewed per Medication Renewal Policy. Medication was last renewed on 1/15/21.    Tino Wise, [facility] Triage/Med Refill 4/6/2021     Requested Prescriptions   Pending Prescriptions Disp Refills     furosemide (LASIX) 20 MG tablet [Pharmacy Med Name: Furosemide 20 MG Oral Tablet] 30 tablet 0     Sig: TAKE 1 TABLET BY MOUTH ONCE DAILY AS NEEDED       Diuretics/Combination Diuretics Refill Protocol  Passed - 4/5/2021  1:35 PM        Passed - Visit with PCP or prescribing provider visit in past 12 months     Last office visit with prescriber/PCP: 2/19/2021 Nicholas Santos CNP OR same dept: 2/19/2021 Nicholas Santos CNP OR same specialty: 2/19/2021 Nicholas Santos CNP  Last physical: Visit date not found Last MTM visit: Visit date not found   Next visit within 3 mo: Visit date not found  Next physical within 3 mo: Visit date not found  Prescriber OR PCP: Nicholas Santos CNP  Last diagnosis associated with med order: 1. Lower extremity edema  - furosemide (LASIX) 20 MG tablet [Pharmacy Med Name: Furosemide 20 MG Oral Tablet]; TAKE 1 TABLET BY MOUTH ONCE DAILY AS NEEDED  Dispense: 30 tablet; Refill: 0    2. Type 2 diabetes mellitus without complication, unspecified whether long term insulin use (H)  - LANTUS SOLOSTAR U-100 INSULIN 100 unit/mL (3 mL) pen [Pharmacy Med Name: Lantus SoloStar 100 UNIT/ML Subcutaneous Solution Pen-injector]; INJECT 15 UNITS SUBCUTANEOUSLY ONCE DAILY  Dispense: 15 mL; Refill: 0    If protocol passes may refill for 12 months if within 3 months of last provider visit (or a total of 15 months).             Passed - Serum Potassium in past 12 months      Lab Results   Component Value Date    Potassium 4.8 02/19/2021             Passed - Serum Sodium in past 12 months      Lab Results   Component Value Date    Sodium 132 (L) 02/19/2021             Passed - Blood pressure on file in past 12 months     BP Readings from Last 1 Encounters:   02/19/21 144/82              Passed - Serum Creatinine in past 12 months      Creatinine   Date Value Ref Range Status   02/19/2021 1.02 0.70 - 1.30 mg/dL Final                LANTUS SOLOSTAR U-100 INSULIN 100 unit/mL (3 mL) pen [Pharmacy Med Name: Lantus SoloStar 100 UNIT/ML Subcutaneous Solution Pen-injector] 15 mL 0     Sig: INJECT 15 UNITS SUBCUTANEOUSLY ONCE DAILY       Insulin/GLP-1 Refill Protocol Failed - 4/5/2021  1:35 PM        Failed - Microalbumin in last year     No results found for: MICROALBUR               Passed - Visit with PCP or prescribing provider visit in last 6 months     Last office visit with prescriber/PCP: 2/19/2021 OR same dept: 2/19/2021 Nicholas Santos CNP OR same specialty: 2/19/2021 Nicholas Santos CNP Last physical: Visit date not found Last MTM visit: Visit date not found     Next appt within 3 mo: Visit date not found  Next physical within 3 mo: Visit date not found  Prescriber OR PCP: Nicholas Santos CNP  Last diagnosis associated with med order: 1. Lower extremity edema  - furosemide (LASIX) 20 MG tablet [Pharmacy Med Name: Furosemide 20 MG Oral Tablet]; TAKE 1 TABLET BY MOUTH ONCE DAILY AS NEEDED  Dispense: 30 tablet; Refill: 0    2. Type 2 diabetes mellitus without complication, unspecified whether long term insulin use (H)  - LANTUS SOLOSTAR U-100 INSULIN 100 unit/mL (3 mL) pen [Pharmacy Med Name: Lantus SoloStar 100 UNIT/ML Subcutaneous Solution Pen-injector]; INJECT 15 UNITS SUBCUTANEOUSLY ONCE DAILY  Dispense: 15 mL; Refill: 0    If protocol passes may refill for 6 months if within 3 months of last provider visit (or a total of 9 months).              Passed - A1C in last 6 months     Hemoglobin A1c   Date Value Ref Range Status   02/19/2021 13.4 (H) <=5.6 % Final               Passed - Blood pressure in last year     BP Readings from Last 1 Encounters:   02/19/21 144/82             Passed - Creatinine done in last year     Creatinine   Date Value Ref Range Status   02/19/2021 1.02 0.70 - 1.30 mg/dL  Final

## 2021-06-16 NOTE — TELEPHONE ENCOUNTER
Left message to call back for: patient  Information to relay to patient:  What is his current dose of Metformin?      Refill request for Metformin 500mg tablets. [facility].

## 2021-06-16 NOTE — TELEPHONE ENCOUNTER
Pt returned call- pt states 500 mg metformin tablet  And also has a question re his insulin, trying to lower his b.s.  Cannot get it below 200 after 25 units of insulin a day

## 2021-06-17 NOTE — TELEPHONE ENCOUNTER
Paperwork completed and given back to MAXIMINO Robles.      Also, it's been 3 months. Time for Dejan to have a diabetic check since we've adjusted his meds.      Nicholas Santos, CNP       Pt states she is really not sure what the letter should state, but would like for you to advocate for her to have the chair while she is checking out

## 2021-06-17 NOTE — TELEPHONE ENCOUNTER
"Refill Approved    Rx renewed per Medication Renewal Policy. Medication was last renewed on 2/19/21, last OV 2/19/21.    Annetta Powell, [facility] Triage/Med Refill 5/23/2021     Requested Prescriptions   Pending Prescriptions Disp Refills     BD AURA 2ND GEN PEN NEEDLE 32 gauge x 5/32\" Ndle [Pharmacy Med Name: BD PEN NEEDLE/AURA [id number] MIS] 100 each 0     Sig: USE 1  ONCE DAILY WITH  LANTUS       Diabetic Supplies Refill Protocol Passed - 5/21/2021  2:21 PM        Passed - Visit with PCP or prescribing provider visit in last 6 months     Last office visit with prescriber/PCP: 2/19/2021 Nicholas Santos CNP OR same dept: 2/19/2021 Nicholas Santos CNP OR same specialty: 2/19/2021 Nicholas Santos CNP  Last physical: Visit date not found Last MTM visit: Visit date not found   Next visit within 3 mo: Visit date not found  Next physical within 3 mo: Visit date not found  Prescriber OR PCP: Nicholas Santos CNP  Last diagnosis associated with med order: 1. Type 2 diabetes mellitus without complication, unspecified whether long term insulin use (H)  - BD AURA 2ND GEN PEN NEEDLE 32 gauge x 5/32\" Ndle [Pharmacy Med Name: BD PEN NEEDLE/AURA [id number] MIS]; USE 1  ONCE DAILY WITH  LANTUS  Dispense: 100 each; Refill: 0    If protocol passes may refill for 12 months if within 3 months of last provider visit (or a total of 15 months).             Passed - A1C in last 6 months     Hemoglobin A1c   Date Value Ref Range Status   02/19/2021 13.4 (H) <=5.6 % Final                              "

## 2021-06-17 NOTE — TELEPHONE ENCOUNTER
Forms Request  Name of form/paperwork:   Physician Order  Have you been seen for this request: N/A  Do we have the form: Yes- placed in Provider's Inbox (yellow folder)  When is form needed by: when done  How would you like the form returned: Fax:  [phone]  Patient Notified form requests are processed in 3-5 business days: N/A  Okay to leave a detailed message? N/A

## 2021-06-17 NOTE — TELEPHONE ENCOUNTER
Left message to call back for: Pt.  Information to relay to patient:  Pt is due for diabetic check with Nicholas Santos CNP. Please help schedule appointment.     Margaret Monique CMA

## 2021-06-17 NOTE — TELEPHONE ENCOUNTER
Faxed and placed in to be scanned.     Copy was made? N/a.    Margaret Monique, [facility]

## 2021-06-18 NOTE — PATIENT INSTRUCTIONS - HE
Patient Instructions by Nicholas Santos CNP at 12/9/2020 11:00 AM     Author: Nicholas Santos CNP Service: -- Author Type: Nurse Practitioner    Filed: 12/9/2020 11:28 AM Encounter Date: 12/9/2020 Status: Addendum    : Nicholas Santos CNP (Nurse Practitioner)    Related Notes: Original Note by Nicholas Santos CNP (Nurse Practitioner) filed at 12/9/2020 11:26 AM       For the sinus congestion, I sent the fluticasone nasal spray.  It may take several days to get working so let's see if we can help open up the nose.    I will work on getting the wound care referral to [facility] taken care of.    For the high blood pressure, let's start the medication metoprolol.  Like I said, we will start low and build up until we can find an effective dose for you.  Watch out for lightheadedness/dizziness, particularly with position changes when for starting this medicine.  That should go away as you acclimate. Let me know if pressure continues to be elevated.    Blood work today to assess for other causes of your high blood pressure/elevated heart rate.      Patient Education     Metoprolol extended-release tablets  Brand Names: toprol, Toprol XL  What is this medicine?  METOPROLOL (me TOE proe lole) is a beta-blocker. Beta-blockers reduce the workload on the heart and help it to beat more regularly. This medicine is used to treat high blood pressure and to prevent chest pain. It is also used to after a heart attack and to prevent an additional heart attack from occurring.  How should I use this medicine?  Take this medicine by mouth with a glass of water. Follow the directions on the prescription label. Do not crush or chew. Take this medicine with or immediately after meals. Take your doses at regular intervals. Do not take more medicine than directed. Do not stop taking this medicine suddenly. This could lead to serious heart-related effects.  Talk to your pediatrician regarding the use of this medicine in children.  While this drug may be prescribed for children as young as 6 years for selected conditions, precautions do apply.  What side effects may I notice from receiving this medicine?  Side effects that you should report to your doctor or health care professional as soon as possible:    allergic reactions like skin rash, itching or hives    cold or numb hands or feet    depression    difficulty breathing    faint    fever with sore throat    irregular heartbeat, chest pain    rapid weight gain    swollen legs or ankles  Side effects that usually do not require medical attention (report to your doctor or health care professional if they continue or are bothersome):    anxiety or nervousness    change in sex drive or performance    dry skin    headache    nightmares or trouble sleeping    short term memory loss    stomach upset or diarrhea    unusually tired  What may interact with this medicine?  This medicine may interact with the following medications:    certain medicines for blood pressure, heart disease, irregular heart beat    certain medicines for depression, like monoamine oxidase (MAO) inhibitors, fluoxetine, or paroxetine    clonidine    dobutamine    epinephrine    isoproterenol    reserpine  What if I miss a dose?  If you miss a dose, take it as soon as you can. If it is almost time for your next dose, take only that dose. Do not take double or extra doses.  Where should I keep my medicine?  Keep out of the reach of children.  Store at room temperature between 15 and 30 degrees C (59 and 86 degrees F). Throw away any unused medicine after the expiration date.  What should I tell my health care provider before I take this medicine?  They need to know if you have any of these conditions:    diabetes    heart or vessel disease like slow heart rate, worsening heart failure, heart block, sick sinus syndrome or Raynaud's disease    kidney disease    liver disease    lung or breathing disease, like asthma or  emphysema    pheochromocytoma    thyroid disease    an unusual or allergic reaction to metoprolol, other beta-blockers, medicines, foods, dyes, or preservatives    pregnant or trying to get pregnant    breast-feeding  What should I watch for while using this medicine?  Visit your doctor or health care professional for regular check ups. Contact your doctor right away if your symptoms worsen. Check your blood pressure and pulse rate regularly. Ask your health care professional what your blood pressure and pulse rate should be, and when you should contact them.  You may get drowsy or dizzy. Do not drive, use machinery, or do anything that needs mental alertness until you know how this medicine affects you. Do not sit or stand up quickly, especially if you are an older patient. This reduces the risk of dizzy or fainting spells. Contact your doctor if these symptoms continue. Alcohol may interfere with the effect of this medicine. Avoid alcoholic drinks.  NOTE:This sheet is a summary. It may not cover all possible information. If you have questions about this medicine, talk to your doctor, pharmacist, or health care provider. Copyright  2018 [employer]

## 2021-06-21 NOTE — LETTER
Letter by Nicholas Santos CNP at      Author: Nicholas Santos CNP Service: -- Author Type: --    Filed:  Encounter Date: 1/2/2021 Status: (Other)         Dejan Massey  [address] [place] [id number]             January 2, 2021         Dear Mr. Massey,    Below are the results from your recent visit:    Resulted Orders   HM1 (CBC with Diff)   Result Value Ref Range    WBC 11.1 (H) 4.0 - 11.0 thou/uL    RBC 5.70 4.40 - 6.20 mill/uL    Hemoglobin 14.2 14.0 - 18.0 g/dL    Hematocrit 46.3 40.0 - 54.0 %    MCV 81 80 - 100 fL    MCH 24.9 (L) 27.0 - 34.0 pg    MCHC 30.7 (L) 32.0 - 36.0 g/dL    RDW 20.2 (H) 11.0 - 14.5 %    Platelets 279 140 - 440 thou/uL    MPV 12.6 (H) 8.5 - 12.5 fL    Neutrophils % 70 50 - 70 %    Lymphocytes % 15 (L) 20 - 40 %    Monocytes % 8 2 - 10 %    Eosinophils % 2 0 - 6 %    Basophils % 1 0 - 2 %    Immature Granulocyte % 5 (H) <=0 %    Neutrophils Absolute 7.8 (H) 2.0 - 7.7 thou/uL    Lymphocytes Absolute 1.6 0.8 - 4.4 thou/uL    Monocytes Absolute 0.9 0.0 - 0.9 thou/uL    Eosinophils Absolute 0.2 0.0 - 0.4 thou/uL    Basophils Absolute 0.1 0.0 - 0.2 thou/uL    Immature Granulocyte Absolute 0.5 (H) <=0.0 thou/uL         White counts are coming down which is encouraging, but we're going to keep an eye on this because your bone marrow is releasing immature white blood cells. This is probably a response to your continued healing, but we'll recheck at your next appointment to ensure continued improvements.    Please call with questions or contact us using [email or web address].    Sincerely,         Nicholas Santos CNP

## 2021-07-03 NOTE — ADDENDUM NOTE
Addendum Note by Xochilt Santos CNP at 12/9/2020 11:00 AM     Author: Xochilt Santos CNP Service: -- Author Type: Nurse Practitioner    Filed: 12/10/2020  1:41 PM Encounter Date: 12/9/2020 Status: Signed    : Xochilt Santos CNP (Nurse Practitioner)    Addended by: XOCHILT SANTOS on: 12/10/2020 01:41 PM        Modules accepted: Orders

## 2021-07-03 NOTE — ADDENDUM NOTE
Addendum Note by Xochilt Santos CNP at 12/9/2020 11:00 AM     Author: Xochilt Santos CNP Service: -- Author Type: Nurse Practitioner    Filed: 12/9/2020 12:33 PM Encounter Date: 12/9/2020 Status: Signed    : Xochilt Santos CNP (Nurse Practitioner)    Addended by: XOCHILT SANTOS on: 12/9/2020 12:33 PM        Modules accepted: Orders

## 2021-07-07 NOTE — TELEPHONE ENCOUNTER
Telephone Encounter by Tino Wise RN at 7/7/2021  1:49 PM     Author: Tino Wise RN Service: -- Author Type: Registered Nurse    Filed: 7/7/2021  1:49 PM Encounter Date: 7/7/2021 Status: Signed    : Tino Wise RN (Registered Nurse)       RN cannot approve Refill Request    RN can NOT refill this medication Protocol failed and NO refill given. Last office visit: 2/19/2021 Nicholas Santos CNP Last Physical: Visit date not found Last MTM visit: Visit date not found Last visit same specialty: 2/19/2021 Nicholas Santos CNP.  Next visit within 3 mo: Visit date not found  Next physical within 3 mo: Visit date not found      Tino Wise, [facility] Triage/Med Refill 7/7/2021    Requested Prescriptions   Pending Prescriptions Disp Refills   ? metFORMIN (GLUCOPHAGE) 500 MG tablet [Pharmacy Med Name: metFORMIN HCl 500 MG Oral Tablet] 360 tablet 0     Sig: TAKE 2 TABLETS BY MOUTH TWICE DAILY WITH MEALS       Metformin Refill Protocol Failed - 7/7/2021  1:41 PM        Failed - Microalbumin in last year      No results found for: MICROALBUR               Passed - Blood pressure in last 12 months     BP Readings from Last 1 Encounters:   02/19/21 144/82             Passed - LFT or AST or ALT in last 12 months     Albumin   Date Value Ref Range Status   12/09/2020 3.9 3.5 - 5.0 g/dL Final     Bilirubin, Total   Date Value Ref Range Status   12/09/2020 0.4 0.0 - 1.0 mg/dL Final     Alkaline Phosphatase   Date Value Ref Range Status   12/09/2020 81 45 - 120 U/L Final     AST   Date Value Ref Range Status   12/09/2020 46 (H) 0 - 40 U/L Final     ALT   Date Value Ref Range Status   12/09/2020 74 (H) 0 - 45 U/L Final     Protein, Total   Date Value Ref Range Status   12/09/2020 7.4 6.0 - 8.0 g/dL Final                Passed - GFR or Serum Creatinine in last 6 months     GFR MDRD Non Af Amer   Date Value Ref Range Status   02/19/2021 >60 >60 mL/min/1.73m2 Final     GFR MDRD Af Amer   Date Value Ref Range Status    02/19/2021 >60 >60 mL/min/1.73m2 Final             Passed - Visit with PCP or prescribing provider visit in last 6 months or next 3 months     Last office visit with prescriber/PCP: 2/19/2021 OR same dept: 2/19/2021 Nicholas Santos CNP OR same specialty: 2/19/2021 Nicholas Santos CNP Last physical: Visit date not found Last MTM visit: Visit date not found         Next appt within 3 mo: Visit date not found  Next physical within 3 mo: Visit date not found  Prescriber OR PCP: Nicholas Santos CNP  Last diagnosis associated with med order: 1. Type 2 diabetes mellitus without complication, without long-term current use of insulin (H)  - metFORMIN (GLUCOPHAGE) 500 MG tablet [Pharmacy Med Name: metFORMIN HCl 500 MG Oral Tablet]; TAKE 2 TABLETS BY MOUTH TWICE DAILY WITH MEALS  Dispense: 360 tablet; Refill: 0     If protocol passes may refill for 12 months if within 3 months of last provider visit (or a total of 15 months).           Passed - A1C in last 6 months     Hemoglobin A1c   Date Value Ref Range Status   02/19/2021 13.4 (H) <=5.6 % Final

## 2021-07-08 NOTE — TELEPHONE ENCOUNTER
Telephone Encounter by Philip Lee MA at 7/8/2021  2:14 PM     Author: Philip Lee MA Service: -- Author Type: Medical Assistant    Filed: 7/8/2021  2:14 PM Encounter Date: 7/7/2021 Status: Signed    : Philip Lee MA (Medical Assistant)       Left message to call back for: pt  Information to relay to patient:       30-day prescription sent off.  Due for updated labs.  Please call patient to help schedule.     Nicholas Santos, CNP

## 2021-07-08 NOTE — TELEPHONE ENCOUNTER
Telephone Encounter by Saleem Schrader at 7/8/2021  2:26 PM     Author: Saleem Schrader Service: -- Author Type: Patient Access    Filed: 7/8/2021  2:26 PM Encounter Date: 7/7/2021 Status: Signed    : Saleem Schrader (Patient Access)       Pt called back, relayed message, scheduled appt.

## 2021-07-08 NOTE — TELEPHONE ENCOUNTER
Telephone Encounter by Nicholas Santos CNP at 7/8/2021  1:56 PM     Author: Nicholas Santos CNP Service: -- Author Type: Nurse Practitioner    Filed: 7/8/2021  1:56 PM Encounter Date: 7/7/2021 Status: Signed    : Nicholas Santos CNP (Nurse Practitioner)       30-day prescription sent off.  Due for updated labs.  Please call patient to help schedule.    Nicholas Santos CNP

## 2021-07-16 PROBLEM — E66.01 MORBID OBESITY (H): Status: ACTIVE | Noted: 2020-01-01

## 2021-07-16 PROBLEM — Z93.3 COLOSTOMY PRESENT (H): Status: ACTIVE | Noted: 2020-01-01

## 2021-07-16 PROBLEM — I10 BENIGN ESSENTIAL HYPERTENSION: Status: ACTIVE | Noted: 2020-01-01

## 2021-07-16 PROBLEM — Z86.711 HISTORY OF PULMONARY EMBOLISM: Status: ACTIVE | Noted: 2020-10-20

## 2021-07-16 PROBLEM — E11.9 DIABETES MELLITUS, TYPE 2 (H): Status: ACTIVE | Noted: 2021-01-01

## 2021-08-09 NOTE — TELEPHONE ENCOUNTER
----- Message from Nicholas Santos NP sent at 8/7/2021  1:09 PM CDT -----  Please call the patient.  Labs are looking better than over the winter, but we still have some work to do.  A1c has dropped from 13.4% down to 12.5%.  Our goal is to get him closer to 7% like he was a year ago.    I am going to place a referral to diabetic education to review his medication regimen and diet and see what things can be done to continue to drop that A1c.    Follow-up with me in person in 4 months.    Nicholas Santos, CNP

## 2021-08-11 NOTE — TELEPHONE ENCOUNTER
"metFORMIN (GLUCOPHAGE) 500 MG [id number]/2021NoSig: TAKE 2 TABLETS BY MOUTH TWICE DAILY WITH MEALSSent to pharmacy as: metFORMIN 500 mg tablet (GLUCOPHAGE)E-Prescribing Status: Receipt confirmed by pharmacy (7/8/2021  1:56 PM CDT)     Last Written Prescription Date:  7/8/21  Last Fill Quantity: 120,  # refills: 0   Last office visit provider:  2/19/21     Requested Prescriptions   Pending Prescriptions Disp Refills     metFORMIN (GLUCOPHAGE) 500 MG tablet [Pharmacy Med Name: metFORMIN HCl 500 MG Oral Tablet] 120 tablet 0     Sig: TAKE 2 TABLETS BY MOUTH TWICE DAILY WITH MEALS       Biguanide Agents Failed - 8/9/2021  2:00 PM        Failed - Medication is active on med list        Failed - Recent (6 mo) or future (30 days) visit within the authorizing provider's specialty     Patient had office visit in the last 6 months or has a visit in the next 30 days with authorizing provider or within the authorizing provider's specialty.  See \"Patient Info\" tab in inbasket, or \"Choose Columns\" in Meds & Orders section of the refill encounter.            Passed - Patient is age 10 or older        Passed - Patient has documented A1c within the specified period of time.     If HgbA1C is 8 or greater, it needs to be on file within the past 3 months.  If less than 8, must be on file within the past 6 months.     Recent Labs   Lab Test 08/06/21  1234   A1C 12.5*             Passed - Patient's CR is NOT>1.4 OR Patient's EGFR is NOT<45 within past 12 mos.     Recent Labs   Lab Test 08/06/21  1234 02/19/21  1640   GFRESTIMATED >90 >60   GFRESTBLACK  --  >60       Recent Labs   Lab Test 08/06/21  1234   CR 0.86             Passed - Patient does NOT have a diagnosis of CHF.             Tnio Wise RN 08/11/21 2:06 PM  "

## 2021-08-17 NOTE — TELEPHONE ENCOUNTER
Reason for Call:  Other appointment    Detailed comments: Patient is having swelling & weeping in his legs. He would like to be seen on Friday afternoon per caller. Patient was not present to have triaged.    Phone Number Patient can be reached at: Home number on file [phone]    Best Time: any    Can we leave a detailed message on this number? YES    Call taken on 8/17/2021 at 8:48 AM by Olga Black

## 2021-08-27 NOTE — TELEPHONE ENCOUNTER
Disp Refills Start End MEHRDAD   metoprolol succinate (TOPROL XL) 25 MG 90 tablet 1 2/19/2021  No   Sig - Route: Take 1 tablet (25 mg total) by mouth daily. - Oral   Sent to pharmacy as: metoprolol succinate ER 25 mg tablet,extended release 24 hr (Toprol XL)   E-Prescribing Status: Receipt confirmed by pharmacy (2/19/2021  4:32 PM CST)     insulin glargine (LANTUS SOLOSTAR U-100 INSULIN) 100 unit/mL (3 mL) pen 30 mL 0 4/12/2021  No   Sig - Route: Inject 30 Units under the skin at bedtime. - Subcutaneous   Sent to pharmacy as: Lantus Solostar U-100 Insulin 100 unit/mL (3 mL) subcutaneous pen (insulin glargine)   Notes to Pharmacy: If LANTUS is not covered by insurance, may substitute BASAGLAR at same dose and frequency.     E-Prescribing Status: Receipt confirmed by pharmacy (4/12/2021  3:58 PM CDT)     Routing refill request to provider for review/approval because:  Elevated BP at last visit.   Overdue for office visit, and no upcoming appts on file.    Last Written Prescription Date:  02/19/2021  Last Fill Quantity: 90,  # refills: 1   Last office visit provider:  02/19/2021 with Nicholas Santos NP     Requested Prescriptions   Pending Prescriptions Disp Refills     metoprolol succinate ER (TOPROL-XL) 25 MG 24 hr tablet [Pharmacy Med Name: Metoprolol Succinate ER 25 MG Oral Tablet Extended Release 24 Hour] 30 tablet 0     Sig: Take 1 tablet by mouth once daily       Beta-Blockers Protocol Failed - 8/23/2021  2:24 PM        Failed - Blood pressure under 140/90 in past 12 months     BP Readings from Last 3 Encounters:   02/19/21 (!) 144/82   12/11/20 138/88   12/09/20 (!) 148/84                 Failed - Medication is active on med list        Passed - Patient is age 6 or older        Passed - Recent (12 mo) or future (30 days) visit within the authorizing provider's specialty     Patient has had an office visit with the authorizing provider or a provider within the authorizing providers department within the previous 12  "mos or has a future within next 30 days. See \"Patient Info\" tab in inbasket, or \"Choose Columns\" in Meds & Orders section of the refill encounter.                 LANTUS SOLOSTAR 100 UNIT/ML soln [Pharmacy Med Name: Lantus SoloStar 100 UNIT/ML Subcutaneous Solution Pen-injector] 30 mL 0     Sig: INJECT 30 UNITS SUBCUTANEOUSLY AT BEDTIME       Long Acting Insulin Protocol Failed - 8/23/2021  2:24 PM        Failed - Medication is active on med list        Failed - Recent (6 mo) or future (30 days) visit within the authorizing provider's specialty     Patient had office visit in the last 6 months or has a visit in the next 30 days with authorizing provider or within the authorizing provider's specialty.  See \"Patient Info\" tab in inbasket, or \"Choose Columns\" in Meds & Orders section of the refill encounter.            Passed - Serum creatinine on file in past 12 months     Recent Labs   Lab Test 08/06/21  1234   CR 0.86       Ok to refill medication if creatinine is low          Passed - HgbA1C in past 3 or 6 months     If HgbA1C is 8 or greater, it needs to be on file within the past 3 months.  If less than 8, must be on file within the past 6 months.     Recent Labs   Lab Test 08/06/21  1234   A1C 12.5*             Passed - Patient is age 18 or older             Effie Aguilar 08/26/21 10:23 PM  "

## 2021-08-30 NOTE — TELEPHONE ENCOUNTER
Limited refills approved and sent to pharmacy with notation that patient is overdue for follow-up visit and will need to schedule visit prior to additional refills.   Jewel Waters MD for Danielle

## 2021-09-01 NOTE — TELEPHONE ENCOUNTER
Reason for Call:  Other appointment    Detailed comments: patient prefers to see Nicholas- needs to be seen for leg pain and medication check- would like to be seen on Friday 09.03.21    Phone Number Patient can be reached at: Other phone number:  [phone]    Best Time: any    Can we leave a detailed message on this number? YES    Call taken on 9/1/2021 at 8:10 AM by Jeannie Minaya
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
## 2021-09-03 NOTE — PATIENT INSTRUCTIONS
I would recommend follow-up diabetes labs today.  Please make sure to resume use of the the metformin.  You may want to start by taking 500mg once daily for a couple of days and then increase to 500mg twice daily for a couple days before ultimately returning to the 1000mg dose twice daily.  Please also continue to take the lantus insulin 30 units each evening.  I would like for you to check your blood sugars at least twice daily (fasting in the morning and 2 hours after a meal, alternating which meal) and record them.  I would like for you to visit with one of our Diabetes educators as soon as you're able to schedule an appointment to review lifestyle measures and medication to help better control your blood sugars.  Please bring your blood sugar readings with you to that appointment.    I would also recommend a vist with the eye specialist for a dilated eye exam.      We will also check a d dimer level to evaluate the likelihood of blood clot in your right leg. We will check an xray of your knee today as well.  If the knee xray is negative, I would believe that the pain in the right leg is related either to neuropathy from excessively high blood sugars.  If the pain is not improving as the blood sugars are getting better, however, consideration could be given to imaging the knee further and/or pursuing a course of physical therapy.      In the short term, I sent in a prescription for Ultram, to be used for severe pains up to twice daily for the next week.  This medication is a controlled substance, so should be placed in a secure location, away from others. It can cause mental clouding and sedation. It should not be used while you are at work. I will only be able to prescribe this for the next week, after that time, my hope is that the gabapentin will be starting to work.  Please also start taking gabapentin 300mg at bedtime to help with pain/sleep.  This medication, too, can cause sedation, so should only be used  at bedtime.

## 2021-09-05 PROBLEM — Z93.2 ILEOSTOMY PRESENT (H): Status: ACTIVE | Noted: 2020-01-01

## 2021-09-05 PROBLEM — T07.XXXA MULTIPLE TRAUMATIC INJURIES: Status: ACTIVE | Noted: 2020-09-12

## 2021-09-09 NOTE — TELEPHONE ENCOUNTER
"Called pt and conveyed provider advice.  Pt states \"The pain has calmed down now -> pain is down to 6-to-7/10.\"  \"Definitely better than it was overnight.\"  \"Took Tramadol and gabapentin upon waking this morning.\"  \"Maybe all the meds were worn off overnight when the pain was worse.\"    Swelling and pain in R leg right now is \"same as when I was in clinic on Sept 3rd.\"    Pt states \"Rash and nausea have resolved, even though still taking gabapentin and Tramadol.\"    Pt therefore declines ED advice; asks if Dr Waters would agree to either see pt today, or place orders for outpatient imaging as discussed at OV of 9/3 ?    Please advise (again) ....  Thanks so much-        "

## 2021-09-09 NOTE — TELEPHONE ENCOUNTER
Order placed for MRI of the right knee.  Please contact patient to provide him with number to schedule at [facility] at his convenience. ([phone]).     Thanks,  Jewel Waters MD

## 2021-09-09 NOTE — TELEPHONE ENCOUNTER
Note reviewed.  Symptoms do sound worse than they were when he was seen in clinic last week, particularly 10/10 sharp leg pain.  I had checked a d-dimer when he was seen in clinic, which was normal, as well as a knee xray, which had shown minimal DJD.  Given severity of symptoms, I think that ER evaluation would be appropriate.  Please advise patient.  Thank you.   Jewel Waters MD
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
## 2021-09-09 NOTE — TELEPHONE ENCOUNTER
"Caller is pt's girlfriend (Daniela).  Not currently with pt.  Currently at her workplace.  Not listed as an authorized rep on pt's chart.  Indicated as emergency contact only.  Therefore receiving information only.    Caller reports \"R leg pain x 3 weeks.\"  \"Started gabapentin and Tramadol.\"  \"However still not able to go back to work.\"  \"Has problems getting around.\"  \"Now has a rash from the medication.\"  \"Also feels nauseated and dizzy.\"  \"Also severe fatigue.\"  \"Also the gabapentin and Tramadol did not help with the pain at all.\"    Conveyed necessity of speaking with pt directly.  Offered to call pt now, however girlfrienanh states \"No, he's probably sleeping and has his phone off.\"  Girlfriend therefore intends to communicate with pt via text and during her work breaks to facilitate callback directly from pt.  Advised pt to request a triage nurse.  Girlfriend verbalizes understanding.  Agrees to plan.    Note -> Triager will also attempt to contact pt directly for triage due to high-risk history of PE....  Called back to pt's girlfriend (Daniela).  States \"He already talked with another triage nurse.\"  (See new triage encounter now found in chart notes).    [facility] Nurse Advisor     Additional Information    RN needs further essential information from caller in order to complete triage    Protocols used: INFORMATION ONLY CALL-A-AH      "

## 2021-09-10 NOTE — TELEPHONE ENCOUNTER
Reason for Call:  Medication or medication refill:    Do you use a [facility]?  Name of the pharmacy and phone number for the current request:  [facility]    Name of the medication requested: would like tramadol refill for a few weeks at least, is set to have an mri next fridayw    Other request:     Can we leave a detailed message on this number? YES    Phone number patient can be reached at: Cell number on file:    Telephone Information:   Mobile [phone]       Best Time:     Call taken on 9/10/2021 at 12:17 PM by Ashley Hayden

## 2021-09-11 NOTE — TELEPHONE ENCOUNTER
Routing refill request to provider for review/approval because:  Drug not on the [facility] refill protocol     Last Written Prescription Date:  9/3/21  Last Fill Quantity: 14,  # refills: 0  Last office visit provider:  9/3/21    Requested Prescriptions   Pending Prescriptions Disp Refills     traMADol (ULTRAM) 50 MG tablet [Pharmacy Med Name: traMADol HCl 50 MG Oral Tablet] 14 tablet 0     Sig: TAKE 1 TABLET BY MOUTH TWICE DAILY AS NEEDED FOR SEVERE PAIN       There is no refill protocol information for this order          Susanna valle RN 09/10/21 11:13 PM

## 2021-09-13 NOTE — TELEPHONE ENCOUNTER
Please contact patient.  As we had discussed at his visit, the Tramadol was only for very short term (1 week) use for severe pain.  If he is able to tolerate NSAIDs, I would be happy to prescribe an anti-inflammatory medicine to assist in controlling his pain. Otherwise I would recommend he continue on the gabapentin.    Thank you,  Jewel Waters MD

## 2021-09-13 NOTE — TELEPHONE ENCOUNTER
Medication: Tramadol  Last Date Filled 9/3/21  Last appointment addressing medication use: 9/3/21      Taken as prescribed from physician notes?     CSA in last year: NO    Random Utox in last year: NO  (if any of the above answer NO - schedule with PCP)     Opioids + benzodiazepines? NO  (if the above answer YES - schedule with PCP every 6 months)       All responses suggest: Scheduling with PCP for further intervention       9/3/21  In the short term, I sent in a prescription for Ultram, to be used for severe pains up to twice daily for the next week.  This medication is a controlled substance, so should be placed in a secure location, away from others. It can cause mental clouding and sedation. It should not be used while you are at work. I will only be able to prescribe this for the next week, after that time, my hope is that the gabapentin will be starting to work.  Please also start taking gabapentin 300mg at bedtime to help with pain/sleep.  This medication, too, can cause sedation, so should only be used at bedtime.    Return in about 4 weeks (around 10/1/2021) for Follow up regarding leg pain-will reach out with results of xray which may alter recommeded followup.

## 2021-09-16 NOTE — TELEPHONE ENCOUNTER
Please phone patient to notify him that I got the result back from his MRI of the right knee. It had shown normal-appearing meniscus, joint spaces, and ligaments.  They do comment on some wearing of the cartilage behind the kneecap and at the inner aspect of the knee joint.  Additionally, they see a healing stress fracture at the top of the tibia bone (tibial plateau) , as well as a more recent stress fracture just below the knee joint at the proximal tibia.    I would recommend consultation with ortho or sports medicine for management of this condition.  Referral placed.  He should be hearing from  by early next week.  I would recommend avoidance of jumping and prolonged weight bearing while waiting to see ortho.      Thanks,  Jewel Waters MD
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
## 2021-09-17 NOTE — TELEPHONE ENCOUNTER
Patient prescribed Naproxen 500mg to be used up to twice daily with food as needed for right leg pain.  Recommend follow-up with Ortho/sports medicine.  Patient notified by CMA.  Jewel Waters MD

## 2021-09-28 NOTE — TELEPHONE ENCOUNTER
"Metoprolol:   Last Written Prescription Date:  8/30/21  Last Fill Quantity: 30,  # refills: 0   Last office visit provider:  9/3/21     Requested Prescriptions   Pending Prescriptions Disp Refills     metoprolol succinate ER (TOPROL-XL) 25 MG 24 hr tablet [Pharmacy Med Name: Metoprolol Succinate ER 25 MG Oral Tablet Extended Release 24 Hour] 30 tablet 0     Sig: Take 1 tablet by mouth once daily       Beta-Blockers Protocol Passed - 9/27/2021  1:55 PM        Passed - Blood pressure under 140/90 in past 12 months     BP Readings from Last 3 Encounters:   09/03/21 126/82   02/19/21 (!) 144/82   12/11/20 138/88                 Passed - Patient is age 6 or older        Passed - Recent (12 mo) or future (30 days) visit within the authorizing provider's specialty     Patient has had an office visit with the authorizing provider or a provider within the authorizing providers department within the previous 12 mos or has a future within next 30 days. See \"Patient Info\" tab in inbasket, or \"Choose Columns\" in Meds & Orders section of the refill encounter.              Passed - Medication is active on med list           gabapentin (NEURONTIN) 300 MG capsule [Pharmacy Med Name: Gabapentin 300 MG Oral Capsule] 30 capsule 0     Sig: Take 1 capsule by mouth once daily at bedtime       There is no refill protocol information for this order          Luis Harris RN 09/28/21 1:55 PM  "

## 2021-09-28 NOTE — TELEPHONE ENCOUNTER
"Routing refill request to provider for review/approval because:  Drug not on the [facility] refill protocol     Last Written Prescription Date:  9/3/21  Last Fill Quantity: 30,  # refills: 0   Last office visit provider:  9/3/21     Requested Prescriptions   Pending Prescriptions Disp Refills     gabapentin (NEURONTIN) 300 MG capsule [Pharmacy Med Name: Gabapentin 300 MG Oral Capsule] 30 capsule 0     Sig: Take 1 capsule by mouth once daily at bedtime       There is no refill protocol information for this order      Signed Prescriptions Disp Refills    metoprolol succinate ER (TOPROL-XL) 25 MG 24 hr tablet 90 tablet 3     Sig: Take 1 tablet by mouth once daily       Beta-Blockers Protocol Passed - 9/27/2021  1:55 PM        Passed - Blood pressure under 140/90 in past 12 months     BP Readings from Last 3 Encounters:   09/03/21 126/82   02/19/21 (!) 144/82   12/11/20 138/88                 Passed - Patient is age 6 or older        Passed - Recent (12 mo) or future (30 days) visit within the authorizing provider's specialty     Patient has had an office visit with the authorizing provider or a provider within the authorizing providers department within the previous 12 mos or has a future within next 30 days. See \"Patient Info\" tab in inbasket, or \"Choose Columns\" in Meds & Orders section of the refill encounter.              Passed - Medication is active on med list             Luis Harris RN 09/28/21 1:56 PM  "

## 2021-09-30 NOTE — TELEPHONE ENCOUNTER
Medication: gabapentin 300 MG capsule  Last Date Filled: 9/3/21  Last appointment addressing medication: 9/3/21  Last B/P:  BP Readings from Last 3 Encounters:   09/03/21 126/82   02/19/21 (!) 144/82   12/11/20 138/88     Last labs pertaining to refill: N/A      Pend medication and associate diagnosis before routing to Provider for review.       If patient has not been seen in over 1 year, pend 30 day supply and notify patient they are due for an appointment before any further refills.

## 2021-10-12 NOTE — TELEPHONE ENCOUNTER
"Routing refill request to provider for review/approval because:  Labs not current:  ALT    Last Written Prescription Date:  9/17/21  Last Fill Quantity: 30,  # refills: 1   Last office visit provider:  2/19/21     Requested Prescriptions   Pending Prescriptions Disp Refills     naproxen (NAPROSYN) 500 MG tablet [Pharmacy Med Name: Naproxen 500 MG Oral Tablet] 30 tablet 0     Sig: TAKE 1 TABLET BY MOUTH TWICE DAILY WITH MEALS       NSAID Medications Failed - 10/11/2021  5:31 AM        Failed - Normal ALT on file in past 12 months     Recent Labs   Lab Test 09/03/21  1649   ALT 48*             Passed - Blood pressure under 140/90 in past 12 months     BP Readings from Last 3 Encounters:   09/03/21 126/82   02/19/21 (!) 144/82   12/11/20 138/88                 Passed - Normal AST on file in past 12 months     Recent Labs   Lab Test 09/03/21  1649   AST 28             Passed - Recent (12 mo) or future (30 days) visit within the authorizing provider's specialty     Patient has had an office visit with the authorizing provider or a provider within the authorizing providers department within the previous 12 mos or has a future within next 30 days. See \"Patient Info\" tab in inbasket, or \"Choose Columns\" in Meds & Orders section of the refill encounter.              Passed - Patient is age 6-64 years        Passed - Normal CBC on file in past 12 months     Recent Labs   Lab Test 02/19/21  1640   WBC 7.7   RBC 5.49   HGB 14.6   HCT 45.3                    Passed - Medication is active on med list        Passed - Normal serum creatinine on file in past 12 months     Recent Labs   Lab Test 09/03/21  1649   CR 0.78       Ok to refill medication if creatinine is low               marty banks, RN 10/11/21 7:09 PM  "

## 2021-10-15 NOTE — TELEPHONE ENCOUNTER
Reason for Call:  Other appointment    Detailed comments: LEFT POINTER FINGER POSS INFECTION    Phone Number Patient can be reached at: Cell number on file:    Telephone Information:   Mobile [phone]       Best Time: ANY    Can we leave a detailed message on this number? YES    Call taken on 10/15/2021 at 9:23 AM by Hannah Yan

## 2021-10-15 NOTE — TELEPHONE ENCOUNTER
Called pt and let him know that we are full for today. I let him know that WIC was an option as well as urgent care if he thought it was that bad of an issue. I did let him know that he can try calling early in the morning to try to get in on a same day appointment. Pt verified understanding and states that he might do WIC.     Margaret Monique, CMA

## 2021-11-17 NOTE — TELEPHONE ENCOUNTER
"Last Written Prescription Date:  8/11/21  Last Fill Quantity: 360,  # refills: 0   Last office visit provider:  9/3/21     Requested Prescriptions   Pending Prescriptions Disp Refills     metFORMIN (GLUCOPHAGE) 500 MG tablet [Pharmacy Med Name: metFORMIN HCl 500 MG Oral Tablet] 360 tablet 0     Sig: TAKE 2 TABLETS BY MOUTH TWICE DAILY WITH MEALS       Biguanide Agents Passed - 11/15/2021  1:37 PM        Passed - Patient is age 10 or older        Passed - Patient has documented A1c within the specified period of time.     If HgbA1C is 8 or greater, it needs to be on file within the past 3 months.  If less than 8, must be on file within the past 6 months.     Recent Labs   Lab Test 09/03/21  1649   A1C 12.4*             Passed - Patient's CR is NOT>1.4 OR Patient's EGFR is NOT<45 within past 12 mos.     Recent Labs   Lab Test 09/03/21  1649 08/06/21  1234 02/19/21  1640   GFRESTIMATED >90   < > >60   GFRESTBLACK  --   --  >60    < > = values in this interval not displayed.       Recent Labs   Lab Test 09/03/21  1649   CR 0.78             Passed - Patient does NOT have a diagnosis of CHF.        Passed - Medication is active on med list        Passed - Recent (6 mo) or future (30 days) visit within the authorizing provider's specialty     Patient had office visit in the last 6 months or has a visit in the next 30 days with authorizing provider or within the authorizing provider's specialty.  See \"Patient Info\" tab in inbasket, or \"Choose Columns\" in Meds & Orders section of the refill encounter.                 Tino Wise RN 11/17/21 10:36 AM  "

## 2021-12-05 ENCOUNTER — HEALTH MAINTENANCE LETTER (OUTPATIENT)
Age: 46
End: 2021-12-05

## 2021-12-26 ENCOUNTER — TELEPHONE (OUTPATIENT)
Dept: FAMILY MEDICINE | Facility: CLINIC | Age: 46
End: 2021-12-26
Payer: COMMERCIAL

## 2021-12-26 NOTE — TELEPHONE ENCOUNTER
Received paperwork that patient passed away from COVID-19 on 2021 at [facility].  Please forward this information on to the team who switches over the chart to .    Nicholas Santos, CNP
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
